# Patient Record
Sex: FEMALE | Race: WHITE | HISPANIC OR LATINO | Employment: FULL TIME | URBAN - NONMETROPOLITAN AREA
[De-identification: names, ages, dates, MRNs, and addresses within clinical notes are randomized per-mention and may not be internally consistent; named-entity substitution may affect disease eponyms.]

---

## 2023-09-07 NOTE — PROGRESS NOTES
Subjective      HPI:    Delaney Kaplan. Is 62 y.o.. female. Here for acute visit-             Chief Complaint   Patient presents with    Ear Fullness     Constant X approx 2 weeks      Sore Throat     Constant X approx 2 days    Sinus Problem     Drainage constant x 2 days    PT denies fever  Pt has not taken anything to alleviate sxs, nothing exacerbates sxs               has pt been seen recently for this problem ( within past 2-3 weeks) ?  No    if yes- where?n/a    by who?n/a    what treatment was provided?n/a      Social History     Tobacco Use   Smoking Status Former    Packs/day: 1.50    Years: 6.00    Additional pack years: 0.00    Total pack years: 9.00    Types: Cigarettes   Smokeless Tobacco Never        reports current alcohol use.       Objective            Vitals:    09/08/23 1331   BP: 106/71   BP Location: Left arm   Patient Position: Sitting   BP Cuff Size: Large adult   Pulse: 80   Resp: 14   Temp: 36.4 °C (97.5 °F)   SpO2: 96%   Weight: 97.1 kg (214 lb)           PHYSICAL:      Pt is A and O x3, NAD, Vital signs are within normal limits  General Appearance- normal , good hygiene, talks easily  EYES- conjunctiva- normal  lids- normal  EARS/NOSE-TM's normal, head normocephalic and atraumatic, nasopharynx-no nasal discharge, no trismus, no hot potato voice  OROPHARYNX- red with mild exudate  NECK- supple, FROM  LYMPH- mild bilateral scattered cervical lymph nodes palpated - tender  CV- RRR without murmur  EXTREMITIES- no edema or varicosities  PULM- CTA bilaterally  Respiratory effort- normal respiratory effort , no retractions or nasal flaring   ABDOMEN- normoactive BS's  SKIN- no abnormal skin lesions on inspection or palpation   NEURO- no focal deficits  PSYCH- pleasant, normal judgement and insight          BP Readings from Last 3 Encounters:   09/08/23 106/71   12/15/22 128/80   10/04/22 106/71         Wt Readings from Last 3 Encounters:   09/08/23 97.1 kg (214 lb)   12/15/22 102 kg (225 lb 6.4  oz)   10/04/22 102 kg (225 lb 9.6 oz)       BMI Readings from Last 3 Encounters:   09/08/23 35.61 kg/m²   12/15/22 37.51 kg/m²   10/04/22 36.41 kg/m²           The number and complexity of problems addressed is considered moderate.  The amount and/or complexity of data reviewed and analyzed is considered moderate. The risk of complications and/or morbidity/mortality of patient is considered moderate. Overall, this patient encounter is considered a moderate risk visit.      What are antibiotics?  Antibiotics are medicines that help people fight infections caused by bacteria. They work by killing bacteria that are in the body. These medicines come in many different forms, including pills, ointments, and liquids that are given by injection.    Antibiotics can do a lot of good. For people with serious bacterial infections, antibiotics can save lives. But people use them far too often, even when they're not needed. This is causing a very serious problem called antibiotic resistance. Antibiotic resistance happens when bacteria that have been exposed to an antibiotic change so that the antibiotic can no longer kill them. In those bacteria, the antibiotic has no effect. Because of this problem, doctors are having a harder and harder time treating infections. Experts worry that there will soon be infections that don't respond to any antibiotics.    When are antibiotics helpful?  Antibiotics can help people fight off infections caused by bacteria. They do not work on infections caused by viruses.    Some common bacterial infections that are treated with antibiotics include:    Strep throat    Pneumonia (an infection of the lungs)    Bladder infections    Infections you catch through sex, such as gonorrhea and chlamydia    When are antibiotics NOT helpful?    Antibiotics do not work on infections caused by viruses.    Antibiotics are not helpful for the common cold, because the common cold is caused by a virus.    Antibiotics  are not helpful for the flu, because the flu is caused by a virus. (People with the flu can be treated with medicines called antiviral medicines, which are different from antibiotics.)      Even though antibiotics don't work on infections caused by viruses, people sometimes believe that they do. That's because they took antibiotics for a viral infection before and then got better. The problem is that those people would have gotten better with or without an antibiotic. When they get better with the antibiotic, they think that's what cured them, when in reality the antibiotic had nothing to do with it.    What's the harm in taking antibiotics even if they might not help?  There are many reasons you should not take antibiotics unless you absolutely need them:    Antibiotics cause side effects, such as nausea, vomiting, and diarrhea. They can even make it more likely that you will get a different kind of infection, such as yeast infection (if you are a woman).    Allergies to antibiotics are common. You can develop an allergy to an antibiotic, even if you have not had a problem with it before. Some allergies are just unpleasant, causing rashes and itching. But some can be very serious and even life-threatening. It is better to avoid any risk of an allergy, if the antibiotic wouldn't help you anyway.    Overuse of antibiotics leads to antibiotic resistance. Using antibiotics when they are not needed gives bacteria a chance to change, so that the antibiotics cannot hurt them later on. People who have infections caused by antibiotic-resistant bacteria often have to be treated in the hospital with many different antibiotics. People can even die from these infections, because there is no antibiotic that will cure them.    When should I take antibiotics?  You should take antibiotics only when a doctor or nurse prescribes them to you. You should never take antibiotics prescribed to someone else, and you should not take  "antibiotics that were prescribed to you for a previous illness. When prescribing an antibiotic, doctors and nurses have to carefully pick the right antibiotic for a particular infection. Not all antibiotics work on all bacteria.    If you do have an infection caused by a bacteria, your doctor or nurse might want to find out what that bacteria is, and which antibiotics can kill it. They do this by taking a \"culture\" of the bacteria and growing it in the lab. But it's not possible to do a culture on someone who has already started an antibiotic. So if you start an antibiotic without input from a doctor or nurse it will be impossible to know if you have taken the right one.    What can I do to reduce antibiotic resistance?  Here are some things that can help:    Do not pressure your doctor or nurse for antibiotics when they do not think you need them.    If you are prescribed antibiotics, finish all of the medicine and take it exactly as directed. Never skip doses or stop taking the medicine without talking to your doctor or nurse.    Do not give antibiotics that were prescribed to you to anyone else.    What if my doctor prescribes an antibiotic that did not work for me before?  If an antibiotic did not work for you before, that does not mean it will never work for you. If you have used an antibiotic before and it did not work, tell your doctor. But keep in mind that the infection you had before might not have been caused by the same bacteria that you have now. The \"best\" antibiotic is the right one for the bacteria causing the infection, not for the person with the infection.    What if I am allergic to an antibiotic?  If you had a bad reaction to an antibiotic, tell your doctor or nurse about it. But do not assume you are allergic unless your doctor or nurse tells you that you are.    Many people who think they are allergic to an antibiotic are wrong. If you get nauseous after taking an antibiotic, that does not " mean you are allergic to it. Nausea is a common side effect of many antibiotics. If you are a woman and you get a yeast infection after taking an antibiotic, that does not mean you are allergic to it. Yeast infections are a common side effect of antibiotics.    Symptoms of antibiotic allergy can be mild and include a flat rash and itching. They can also be more serious and include:    Hives - Hives are raised, red patches of skin that are usually very itchy (picture 1).    Lip or tongue swelling    Trouble swallowing or breathing    Serious allergy symptoms can start right after you start taking an antibiotic if you are very sensitive. Less serious symptoms, on the other hand, often start a day or more later.    Almost all antibiotics may cause possible side effects of allergic reaction, nausea, vomiting, diarrhea- most worrisome caused by C. diff, and secondary yeast infection.        Assessment/Plan        1. Pharyngitis  amoxicillin (Amoxil) 500 mg capsule      2. Otalgia, unspecified laterality  amoxicillin (Amoxil) 500 mg capsule                  Start amoxicillin         Call if no better or if symptoms worsen

## 2023-09-08 ENCOUNTER — OFFICE VISIT (OUTPATIENT)
Dept: PRIMARY CARE | Facility: CLINIC | Age: 63
End: 2023-09-08
Payer: COMMERCIAL

## 2023-09-08 VITALS
WEIGHT: 214 LBS | DIASTOLIC BLOOD PRESSURE: 71 MMHG | RESPIRATION RATE: 14 BRPM | HEART RATE: 80 BPM | TEMPERATURE: 97.5 F | SYSTOLIC BLOOD PRESSURE: 106 MMHG | BODY MASS INDEX: 35.61 KG/M2 | OXYGEN SATURATION: 96 %

## 2023-09-08 DIAGNOSIS — H92.09 OTALGIA, UNSPECIFIED LATERALITY: ICD-10-CM

## 2023-09-08 DIAGNOSIS — J02.9 PHARYNGITIS, UNSPECIFIED ETIOLOGY: Primary | ICD-10-CM

## 2023-09-08 PROBLEM — J30.9 ALLERGIC RHINITIS: Status: ACTIVE | Noted: 2023-09-08

## 2023-09-08 PROBLEM — J06.9 UPPER RESPIRATORY INFECTION, ACUTE: Status: ACTIVE | Noted: 2023-09-08

## 2023-09-08 PROBLEM — H61.23 BILATERAL IMPACTED CERUMEN: Status: ACTIVE | Noted: 2023-09-08

## 2023-09-08 PROBLEM — R80.9 TYPE 2 DIABETES MELLITUS WITH MICROALBUMINURIA (MULTI): Status: ACTIVE | Noted: 2023-09-08

## 2023-09-08 PROBLEM — J32.9 SINUSITIS: Status: ACTIVE | Noted: 2023-09-08

## 2023-09-08 PROBLEM — H69.90 ETD (EUSTACHIAN TUBE DYSFUNCTION): Status: ACTIVE | Noted: 2023-09-08

## 2023-09-08 PROBLEM — M25.569 PAIN IN JOINT, LOWER LEG: Status: ACTIVE | Noted: 2023-09-08

## 2023-09-08 PROBLEM — R09.81 NASAL CONGESTION: Status: ACTIVE | Noted: 2023-09-08

## 2023-09-08 PROBLEM — M25.549 PAIN IN THUMB JOINT WITH MOVEMENT: Status: ACTIVE | Noted: 2023-09-08

## 2023-09-08 PROBLEM — H93.8X9 EAR CONGESTION: Status: ACTIVE | Noted: 2023-09-08

## 2023-09-08 PROBLEM — E11.29 TYPE 2 DIABETES MELLITUS WITH MICROALBUMINURIA (MULTI): Status: ACTIVE | Noted: 2023-09-08

## 2023-09-08 PROBLEM — E11.9 DIABETES MELLITUS, NEW ONSET (MULTI): Status: ACTIVE | Noted: 2023-09-08

## 2023-09-08 PROBLEM — R80.9 MICROALBUMINURIA: Status: ACTIVE | Noted: 2023-09-08

## 2023-09-08 PROBLEM — M79.642 PAIN OF LEFT HAND: Status: ACTIVE | Noted: 2023-09-08

## 2023-09-08 PROCEDURE — 99214 OFFICE O/P EST MOD 30 MIN: CPT | Performed by: FAMILY MEDICINE

## 2023-09-08 PROCEDURE — 4010F ACE/ARB THERAPY RXD/TAKEN: CPT | Performed by: FAMILY MEDICINE

## 2023-09-08 PROCEDURE — 1036F TOBACCO NON-USER: CPT | Performed by: FAMILY MEDICINE

## 2023-09-08 PROCEDURE — 3078F DIAST BP <80 MM HG: CPT | Performed by: FAMILY MEDICINE

## 2023-09-08 PROCEDURE — 3074F SYST BP LT 130 MM HG: CPT | Performed by: FAMILY MEDICINE

## 2023-09-08 RX ORDER — BLOOD SUGAR DIAGNOSTIC
STRIP MISCELLANEOUS
COMMUNITY
End: 2023-09-08 | Stop reason: WASHOUT

## 2023-09-08 RX ORDER — CALCIUM CARBONATE/VITAMIN D3 500 MG-2.5
1 TABLET,CHEWABLE ORAL DAILY
COMMUNITY
Start: 2022-07-07 | End: 2024-01-17 | Stop reason: WASHOUT

## 2023-09-08 RX ORDER — MELOXICAM 15 MG/1
15 TABLET ORAL DAILY
COMMUNITY
Start: 2023-08-29 | End: 2023-10-24

## 2023-09-08 RX ORDER — AMOXICILLIN 500 MG/1
1000 CAPSULE ORAL 2 TIMES DAILY
Qty: 40 CAPSULE | Refills: 0 | Status: SHIPPED | OUTPATIENT
Start: 2023-09-08 | End: 2023-09-18

## 2023-09-08 RX ORDER — ACETAMINOPHEN 160 MG
1 TABLET,DISINTEGRATING ORAL DAILY
COMMUNITY

## 2023-10-24 PROBLEM — R80.9 MICROALBUMINURIA: Status: RESOLVED | Noted: 2023-09-08 | Resolved: 2023-10-24

## 2023-10-24 PROBLEM — J06.9 UPPER RESPIRATORY INFECTION, ACUTE: Status: RESOLVED | Noted: 2023-09-08 | Resolved: 2023-10-24

## 2023-10-24 PROBLEM — H61.23 BILATERAL IMPACTED CERUMEN: Status: RESOLVED | Noted: 2023-09-08 | Resolved: 2023-10-24

## 2023-10-24 PROBLEM — Z86.69 HX OF MIGRAINES: Status: ACTIVE | Noted: 2023-10-24

## 2023-10-24 PROBLEM — E66.812 OBESITY, CLASS II, BMI 35-39.9: Status: ACTIVE | Noted: 2020-05-14

## 2023-10-24 PROBLEM — E11.9 DIABETES (MULTI): Status: ACTIVE | Noted: 2023-09-08

## 2023-10-24 PROBLEM — H93.8X9 EAR CONGESTION: Status: RESOLVED | Noted: 2023-09-08 | Resolved: 2023-10-24

## 2023-10-24 PROBLEM — J32.9 SINUSITIS: Status: RESOLVED | Noted: 2023-09-08 | Resolved: 2023-10-24

## 2023-10-24 PROBLEM — J30.9 ALLERGIC RHINITIS: Status: RESOLVED | Noted: 2023-09-08 | Resolved: 2023-10-24

## 2023-10-24 PROBLEM — R09.81 NASAL CONGESTION: Status: RESOLVED | Noted: 2023-09-08 | Resolved: 2023-10-24

## 2023-10-24 PROBLEM — R10.12 ABDOMINAL WALL PAIN IN LEFT UPPER QUADRANT: Status: ACTIVE | Noted: 2017-05-09

## 2023-10-24 PROBLEM — E66.9 OBESITY, CLASS II, BMI 35-39.9: Status: ACTIVE | Noted: 2020-05-14

## 2023-10-24 PROBLEM — D25.9 FIBROID UTERUS: Status: ACTIVE | Noted: 2023-10-24

## 2023-10-24 PROBLEM — H92.09 EAR DISCOMFORT: Status: RESOLVED | Noted: 2023-09-08 | Resolved: 2023-10-24

## 2023-10-24 PROBLEM — M79.642 PAIN OF LEFT HAND: Status: RESOLVED | Noted: 2023-09-08 | Resolved: 2023-10-24

## 2023-10-26 ENCOUNTER — OFFICE VISIT (OUTPATIENT)
Dept: ORTHOPEDIC SURGERY | Facility: CLINIC | Age: 63
End: 2023-10-26
Payer: COMMERCIAL

## 2023-10-26 VITALS — HEIGHT: 65 IN | WEIGHT: 230 LBS | BODY MASS INDEX: 38.32 KG/M2

## 2023-10-26 DIAGNOSIS — M17.11 PRIMARY OSTEOARTHRITIS OF RIGHT KNEE: Primary | ICD-10-CM

## 2023-10-26 PROCEDURE — 4010F ACE/ARB THERAPY RXD/TAKEN: CPT | Performed by: ORTHOPAEDIC SURGERY

## 2023-10-26 PROCEDURE — 99214 OFFICE O/P EST MOD 30 MIN: CPT | Performed by: ORTHOPAEDIC SURGERY

## 2023-10-26 PROCEDURE — 1036F TOBACCO NON-USER: CPT | Performed by: ORTHOPAEDIC SURGERY

## 2023-10-26 RX ORDER — MELOXICAM 7.5 MG/1
7.5 TABLET ORAL DAILY
Qty: 90 EACH | Refills: 3 | Status: SHIPPED | OUTPATIENT
Start: 2023-10-26 | End: 2024-04-17 | Stop reason: WASHOUT

## 2023-10-27 NOTE — PROGRESS NOTES
Patient is a 62-year-old female who presents today for evaluation of severe right knee pain.  Is been on for some time now.  She saw Dr. Key.  She is a cortisone injections and taking anti-inflammatories.  She is unable to perform a home exercise program start of pain and disability.  She has never had surgery in the knee previously.  She had x-rays which demonstrated moderate disease but an MRI which revealed significant medial compartment patellofemoral disease with a degenerative tear of her medial meniscus.  She extremely fed up with her quality of life.    Right knee:  AAOx3, NAD, walks with a moderate antalgic gait  Varus allignment  Range of motion lacks 10 degrees of full extension and flexes to 110 degrees  Stable to varus/valgus/anterior/posterior stress through out the range of motion  Slight laxity with varus stress  Diffuse medial  joint line tenderness to palpation  Moderate effusion  SILT in a анна/saph/per/tib distribution  5/5 knee extension/df/pf/ehl  ½ dorsalis pedis and posterior tibial pulse  no popliteal lymphadenopathy  no other overlying lesions  mood: euthymic  Respirations non labored    Plain films were reviewed by myself in clinic today.  She has moderate medial and patellofemoral disease on her plain films.  MRI reveals significant medial compartment disease.  Degenerative meniscus tear, significant lateral facet disease and bipolar edema.    Patient is now failed a greater than 3-month trial of reasonable conservative treatment and wishes proceed with surgery which is indicated at this time.  Discussed the risk benefits alternatives to surgery.  All of their questions were answered.    They are a candidate for outpatient total knee arthroplasty.    I talked with the patient at length about risks, limitations, benefits and alternatives to total knee replacement today. I reviewed concerns about implant wear, loosening, breakage, infection and infection prophylaxis, DVT, PE, death and  other medical and anesthetic complications of surgery. We talked about the potential for persistent pain following surgery since there are many possible causes for knee and leg pain. The patient was advised that knee replacement will only relieve pain that is coming from the knee. We talked about limited range of motion following knee replacement and the importance of physical therapy and their motivation. We talked about improvements in pain management post-operatively and our accelerated rehab program. We talked about wound healing issues and neurovascular complications of surgery. I reviewed functional and activity restrictions in detail. We discussed the possible need for a homologous blood transfusion. We discussed the fact that many of our patients are able to go home in 1 day or less depending on their health, mobility, pre-op preparation, individual home situation and personal preference.  The patient has identified their personal goals of their joint replacement surgery and recovery and we have discussed them. These are documented in our [employer] patient engagement platform. In addition, we have discussed the advantages and disadvantages of various implant and fixation options, as well as various surgical approaches.  The basic concepts of the joint replacement procedure has been reviewed with the patient and the patient has been provided the opportunity to see an actual implant either in the office or in our pre-op education class.  All of the patients questions were answered. The patient can call my office to schedule surgery and the pre-op teaching class. I told the patient that they should contact their primary care physician to discuss fitness for surgery.    This note was created using voice recognition software and was not corrected for typographical or grammatical errors.

## 2023-11-09 ENCOUNTER — APPOINTMENT (OUTPATIENT)
Dept: ORTHOPEDIC SURGERY | Facility: CLINIC | Age: 63
End: 2023-11-09
Payer: COMMERCIAL

## 2023-11-26 ENCOUNTER — TRANSCRIBE ORDERS (OUTPATIENT)
Dept: OPERATING ROOM | Facility: HOSPITAL | Age: 63
End: 2023-11-26
Payer: COMMERCIAL

## 2023-11-26 ENCOUNTER — HOSPITAL ENCOUNTER (OUTPATIENT)
Facility: HOSPITAL | Age: 63
Setting detail: OUTPATIENT SURGERY
End: 2023-11-26
Attending: ORTHOPAEDIC SURGERY | Admitting: ORTHOPAEDIC SURGERY
Payer: COMMERCIAL

## 2023-11-26 DIAGNOSIS — M17.11 UNILATERAL PRIMARY OSTEOARTHRITIS, RIGHT KNEE: Primary | ICD-10-CM

## 2023-12-01 ENCOUNTER — TELEPHONE (OUTPATIENT)
Dept: ORTHOPEDIC SURGERY | Facility: HOSPITAL | Age: 63
End: 2023-12-01
Payer: COMMERCIAL

## 2023-12-07 ENCOUNTER — OFFICE VISIT (OUTPATIENT)
Dept: PRIMARY CARE | Facility: CLINIC | Age: 63
End: 2023-12-07
Payer: COMMERCIAL

## 2023-12-07 VITALS
BODY MASS INDEX: 35.48 KG/M2 | SYSTOLIC BLOOD PRESSURE: 120 MMHG | HEART RATE: 80 BPM | TEMPERATURE: 97.8 F | OXYGEN SATURATION: 97 % | WEIGHT: 213.2 LBS | DIASTOLIC BLOOD PRESSURE: 78 MMHG

## 2023-12-07 DIAGNOSIS — R05.9 COUGH, UNSPECIFIED TYPE: ICD-10-CM

## 2023-12-07 DIAGNOSIS — J01.80 OTHER ACUTE SINUSITIS, RECURRENCE NOT SPECIFIED: Primary | ICD-10-CM

## 2023-12-07 PROCEDURE — 4010F ACE/ARB THERAPY RXD/TAKEN: CPT | Performed by: FAMILY MEDICINE

## 2023-12-07 PROCEDURE — 3074F SYST BP LT 130 MM HG: CPT | Performed by: FAMILY MEDICINE

## 2023-12-07 PROCEDURE — 3078F DIAST BP <80 MM HG: CPT | Performed by: FAMILY MEDICINE

## 2023-12-07 PROCEDURE — 99214 OFFICE O/P EST MOD 30 MIN: CPT | Performed by: FAMILY MEDICINE

## 2023-12-07 PROCEDURE — 1036F TOBACCO NON-USER: CPT | Performed by: FAMILY MEDICINE

## 2023-12-07 RX ORDER — AMOXICILLIN 500 MG/1
CAPSULE ORAL
Qty: 40 CAPSULE | Refills: 0 | Status: SHIPPED | OUTPATIENT
Start: 2023-12-07 | End: 2024-01-11 | Stop reason: ALTCHOICE

## 2023-12-07 NOTE — PROGRESS NOTES
Subjective      HPI:    Delaney Kaplan. Is 63 y.o.. female. Here for acute visit-             Chief Complaint   Patient presents with    Cough    Sinusitis    Immunizations     Declined flu vaccine earlier in the season         What concern/ problem/pain/symptom  brings you here today?  Cough and sinus drainage, headache      how long has pt had sxs?  Approx 1 week      describe symptoms-    fever- yes    Are symptoms all day ? Yes  Do symptoms occur at night?  Yes      has pt tried anything for current symptoms, including medications (OTC or prescription)  ?  No      what makes symptoms worse?  Nothing      Does anything make symptoms better?  Nothing            Social History     Tobacco Use   Smoking Status Former    Packs/day: 1.50    Years: 6.00    Additional pack years: 0.00    Total pack years: 9.00    Types: Cigarettes    Quit date:     Years since quittin.9   Smokeless Tobacco Never        reports current alcohol use.       Objective            Vitals:    23 1035   BP: 120/78   BP Location: Left arm   Patient Position: Sitting   BP Cuff Size: Adult   Pulse: 80   Temp: 36.6 °C (97.8 °F)   TempSrc: Temporal   SpO2: 97%   Weight: 96.7 kg (213 lb 3.2 oz)           PHYSICAL:      Pt is A and O x3, NAD, Vital signs are within normal limits  General Appearance- normal , good hygiene,talks easily  EYES- conjunctiva and lids- normal  EARS/NOSE-TM's normal, head normocephalic and atraumatic, nasopharynx-green nasal discharge, no trismus, no hot potato voice  OROPHARYNX- no exudate  NECK- supple, FROM  LYMPH- no cervical lymph nodes palpated   CV- RRR without murmur  PULM- CTA bilaterally  Respiratory effort- normal respiratory effort , no retractions or nasal flaring   ABDOMEN- normoactive BS's   EXTREMITIES-no edema,NT  SKIN- no abnormal skin lesions on inspection or palpation   NEURO- no focal deficits  PSYCH- pleasant, normal judgement and insight      BP Readings from Last 3 Encounters:   23  120/78   09/08/23 106/71   12/15/22 128/80         Wt Readings from Last 3 Encounters:   12/07/23 96.7 kg (213 lb 3.2 oz)   10/26/23 104 kg (230 lb)   09/08/23 97.1 kg (214 lb)       BMI Readings from Last 3 Encounters:   12/07/23 35.48 kg/m²   10/26/23 38.27 kg/m²   09/08/23 35.61 kg/m²           The number and complexity of problems addressed is considered moderate.  The amount and/or complexity of data reviewed and analyzed is considered moderate. The risk of complications and/or morbidity/mortality of patient is considered moderate. Overall, this patient encounter is considered a moderate risk visit.      What are antibiotics?  Antibiotics are medicines that help people fight infections caused by bacteria. They work by killing bacteria that are in the body. These medicines come in many different forms, including pills, ointments, and liquids that are given by injection.    Antibiotics can do a lot of good. For people with serious bacterial infections, antibiotics can save lives. But people use them far too often, even when they're not needed. This is causing a very serious problem called antibiotic resistance. Antibiotic resistance happens when bacteria that have been exposed to an antibiotic change so that the antibiotic can no longer kill them. In those bacteria, the antibiotic has no effect. Because of this problem, doctors are having a harder and harder time treating infections. Experts worry that there will soon be infections that don't respond to any antibiotics.    When are antibiotics helpful?  Antibiotics can help people fight off infections caused by bacteria. They do not work on infections caused by viruses.    Some common bacterial infections that are treated with antibiotics include:    Strep throat    Pneumonia (an infection of the lungs)    Bladder infections    Infections you catch through sex, such as gonorrhea and chlamydia    When are antibiotics NOT helpful?    Antibiotics do not work on  infections caused by viruses.    Antibiotics are not helpful for the common cold, because the common cold is caused by a virus.    Antibiotics are not helpful for the flu, because the flu is caused by a virus. (People with the flu can be treated with medicines called antiviral medicines, which are different from antibiotics.)      Even though antibiotics don't work on infections caused by viruses, people sometimes believe that they do. That's because they took antibiotics for a viral infection before and then got better. The problem is that those people would have gotten better with or without an antibiotic. When they get better with the antibiotic, they think that's what cured them, when in reality the antibiotic had nothing to do with it.    What's the harm in taking antibiotics even if they might not help?  There are many reasons you should not take antibiotics unless you absolutely need them:    Antibiotics cause side effects, such as nausea, vomiting, and diarrhea. They can even make it more likely that you will get a different kind of infection, such as yeast infection (if you are a woman).    Allergies to antibiotics are common. You can develop an allergy to an antibiotic, even if you have not had a problem with it before. Some allergies are just unpleasant, causing rashes and itching. But some can be very serious and even life-threatening. It is better to avoid any risk of an allergy, if the antibiotic wouldn't help you anyway.    Overuse of antibiotics leads to antibiotic resistance. Using antibiotics when they are not needed gives bacteria a chance to change, so that the antibiotics cannot hurt them later on. People who have infections caused by antibiotic-resistant bacteria often have to be treated in the hospital with many different antibiotics. People can even die from these infections, because there is no antibiotic that will cure them.    When should I take antibiotics?  You should take antibiotics  "only when a doctor or nurse prescribes them to you. You should never take antibiotics prescribed to someone else, and you should not take antibiotics that were prescribed to you for a previous illness. When prescribing an antibiotic, doctors and nurses have to carefully pick the right antibiotic for a particular infection. Not all antibiotics work on all bacteria.    If you do have an infection caused by a bacteria, your doctor or nurse might want to find out what that bacteria is, and which antibiotics can kill it. They do this by taking a \"culture\" of the bacteria and growing it in the lab. But it's not possible to do a culture on someone who has already started an antibiotic. So if you start an antibiotic without input from a doctor or nurse it will be impossible to know if you have taken the right one.    What can I do to reduce antibiotic resistance?  Here are some things that can help:    Do not pressure your doctor or nurse for antibiotics when they do not think you need them.    If you are prescribed antibiotics, finish all of the medicine and take it exactly as directed. Never skip doses or stop taking the medicine without talking to your doctor or nurse.    Do not give antibiotics that were prescribed to you to anyone else.    What if my doctor prescribes an antibiotic that did not work for me before?  If an antibiotic did not work for you before, that does not mean it will never work for you. If you have used an antibiotic before and it did not work, tell your doctor. But keep in mind that the infection you had before might not have been caused by the same bacteria that you have now. The \"best\" antibiotic is the right one for the bacteria causing the infection, not for the person with the infection.    What if I am allergic to an antibiotic?  If you had a bad reaction to an antibiotic, tell your doctor or nurse about it. But do not assume you are allergic unless your doctor or nurse tells you that you " are.    Many people who think they are allergic to an antibiotic are wrong. If you get nauseous after taking an antibiotic, that does not mean you are allergic to it. Nausea is a common side effect of many antibiotics. If you are a woman and you get a yeast infection after taking an antibiotic, that does not mean you are allergic to it. Yeast infections are a common side effect of antibiotics.    Symptoms of antibiotic allergy can be mild and include a flat rash and itching. They can also be more serious and include:    Hives - Hives are raised, red patches of skin that are usually very itchy (picture 1).    Lip or tongue swelling    Trouble swallowing or breathing    Serious allergy symptoms can start right after you start taking an antibiotic if you are very sensitive. Less serious symptoms, on the other hand, often start a day or more later.    Almost all antibiotics may cause possible side effects of allergic reaction, nausea, vomiting, diarrhea- most worrisome caused by C. diff, and secondary yeast infection.        Assessment/Plan        1. Other acute sinusitis, recurrence not specified  amoxicillin (Amoxil) 500 mg capsule      2. Cough, unspecified type                  Start amoxicillin            Call if no better or if symptoms worsen        Plans knee replacement surgery 2/5/23 - Dr Bray

## 2023-12-14 ENCOUNTER — ANCILLARY PROCEDURE (OUTPATIENT)
Dept: RADIOLOGY | Facility: CLINIC | Age: 63
End: 2023-12-14
Payer: COMMERCIAL

## 2023-12-14 DIAGNOSIS — M17.11 UNILATERAL PRIMARY OSTEOARTHRITIS, RIGHT KNEE: ICD-10-CM

## 2023-12-19 NOTE — PROGRESS NOTES
Thank you for attending our Joint Replacement class today in preparation for your upcoming surgery.  Topics discussed include:    MyChart Enrollment  Communication with Care Team  My Chart is the best form of communication to reach all of your caregivers  You can send messages to specific care givers, or a care team  Continued Education  You will be enrolled in a Total Joint Replacement care plan to receive additional education before and after surgery  You can review a short recording of the class content  Access to Medical Records  You can access test results, office notes, appointments, etc.  You can connect to other healthcare systems who use [employer] ([facility], [facility], [facility], etc.)  [employer]  Program Information  Consent to Enroll    Background/Understanding of Joint Replacement Surgery  Potential for same day discharge  Any questions or concerns to be directed to the surgeon's office    How to Prepare for Surgery  Use of Nicotine Products/Smoking  Stop several weeks before surgery  Such products slow down the healing process and increase risk of post-op infection and complications  Clearance for Surgery  Medical Clearance by Specialists  Dental Clearance  Cracked/Broken/Loose teeth left untreated may postpone surgery  The importance of post-op antibiotics for dental visits per surgeon protocol  Preadmission Testing  **Potential for postponed surgery if appropriate clearance is not obtained  Medication Instruction  Follow instructions provided by the doctor who prescribes your medication (typically, but not limited to cardiologist)  Preadmission testing will provide additional instructions during your appointment on what to stop and what to take as you get closer to surgery  For clarification of these instructions, please call preadmission testing directly - [phone]  Tips for Preparing the home for discharge from the hospital  Care Partner  Requirement for surgery, the patient must have a plan to have  help at home  Potential for postponed surgery if plan for home support cannot be established  How the care partner can help after surgery  CHG Body Wash/Mouth Wash  Follow the instructions given at preadmission testing  Body wash is to be used on the body and hair for 5 washes  Mouthwash is to be used the night before and morning of surgery  **This is a system-wide protocol developed by infectious disease professionals, we will not alter our recommendations for those with sensitive skin or those who have special hair needs.  Please follow the instructions as they are written as this will provide the best infection prevention measures for surgery.  Should you have an allergy to one of the products, please discuss with your preadmission team**    What to Expect in the Hospital/At Home  Morning of Surgery NPO Guidelines  Nothing to eat after midnight  Water can be consumed up to 2 hours prior to arrival  Surgical and Post-Surgical Care Team  Surgical Team  Anesthesia Team  Nursing  Physical Therapy  Care Coordinating  Pharmacy  Hospital Arrival Instructions  Arrive at the time provided to you  Consider traffic patterns (rush-hour) based on arrival time  Have arrangements made for a ride home  If discharging same day, care partner should remain at the hospital  Recovering after Surgery  Recovery Room - Visitors are not brought back  Transition to hospital room - 2nd Floor, Visitors will be directed to your room  The presence of and strategies for controlling surgical pain and swelling  The importance of early mobility  Side effects after surgery  What to expect if staying overnight    Discharge Planning  The intended plan for discharge will be for patients to discharge home  All patients require a care partner (family, friend, neighbor, etc.) to stay with the patient for the first few nights after surgery  The inability to secure help at home will postpone surgery  Home Care Services set up per surgeon order  Physical  Therapy  Occupational Therapy  **If desired, private duty care can be arranged by the patient ahead of time**  Outpatient Physical Therapy per surgeon order    Recovering at Home  Wound Care  Follow wound care instructions found in your discharge paperwork  Bandage is water-resistant and you may shower with the bandage  Do not scrub directly over the bandage  Do not submerge in water until cleared (bathtub, hot tub, pool, etc.)    Post-Op Risk Prevention  Infection Prevention  Promptly seek treatment for any infections post-operatively  Routine dental visits must be postponed for 3 months after surgery  Your surgeon may require antibiotics prior to future dental visits  Any concerns for infection not related directly to the knee or the hip should be managed by your primary care provider  Blood Clots  Be sure to complete the course of blood thinning medication as prescribed by your surgeon  Movement every 1-2 hours during the day is encouraged to prevent blood clots  Monitor for signs of blood clots  Wear compression stockings as prescribed by your surgeon  Constipation  Constipation is common following surgery  Drink plenty of fluids  Take stool softener/laxative as prescribed by your surgeon  Move around frequently  Eat foods high in fiber  Fall Prevention  Prepare home ahead of time to clear space to move with walker  Remove throw rugs and electrical cords from walkways  Install railings near any stairways with more than 2 steps  Use night lights for increased visibility at night  Continue to use your assistive device until cleared by surgeon or physical therapy  Dislocation Prevention - Not all procedures will have dislocation precautions  Follow dislocation precautions provided by your surgeon  It is OK to resume sexual activity about 6 weeks following surgery  Be sure to follow any dislocation precautions assigned    Durable Medical Equipment  Cold Therapy  Breg Cold Therapy Machines  Ice/Gel Packs  Assistive  Devices  Folding Walker with Wheels (in the front only)  No Rollators  Crutches if approved by Physical Therapy and Surgeon after surgery  Hip Kits  Raised Toilet Seats  Additional Compression Stockings    Joint Preservation  Healthy Activities when Cleared  Walking  Swimming  Bike Riding  Activities to Avoid  Refrain from repetitive motions which have a high impact on the joint  Gradual Progression  Progress activity slowly, listen to your body  Common Findings - NORMAL after surgery  Clicking/Grinding  Numbness near incision    Physical Therapy  Prehabilitation exercises  START TODAY ON BOTH LEGS  Surgery Specific Precautions  Follow surgery specific precautions found in your discharge paperwork    Follow-Up Visit  All patients will see their surgeon for a follow up visit after surgery  The visit may range from 2-6 weeks after surgery and is surgeon specific      Please don't hesitate to reach out if you have any additional questions or concerns.    Rose Rubin MBA, BSN, RN-BC  Orthopedic   [facility] [phone]

## 2023-12-20 ENCOUNTER — HOSPITAL ENCOUNTER (OUTPATIENT)
Dept: RADIOLOGY | Facility: HOSPITAL | Age: 63
Discharge: HOME | End: 2023-12-20
Payer: COMMERCIAL

## 2023-12-20 ENCOUNTER — EDUCATION (OUTPATIENT)
Dept: ORTHOPEDIC SURGERY | Facility: HOSPITAL | Age: 63
End: 2023-12-20
Payer: COMMERCIAL

## 2023-12-20 PROCEDURE — 73562 X-RAY EXAM OF KNEE 3: CPT | Mod: RT

## 2023-12-20 PROCEDURE — 77073 BONE LENGTH STUDIES: CPT

## 2023-12-20 ASSESSMENT — KOOS JR
STRAIGHTENING KNEE FULLY: MODERATE
BENDING TO THE FLOOR TO PICK UP OBJECT: MILD
HOW SEVERE IS YOUR KNEE STIFFNESS AFTER FIRST WAKING IN MORNING: MODERATE
STANDING UPRIGHT: SEVERE
TWISING OR PIVOTING ON KNEE: SEVERE
GOING UP OR DOWN STAIRS: MILD
KOOS JR SCORING: 54.84
RISING FROM SITTING: MILD

## 2023-12-26 ENCOUNTER — PROCEDURE VISIT (OUTPATIENT)
Dept: ORTHOPEDIC SURGERY | Facility: HOSPITAL | Age: 63
End: 2023-12-26
Payer: COMMERCIAL

## 2023-12-26 PROCEDURE — E0143 WALKER FOLDING WHEELED W/O S: HCPCS | Performed by: ORTHOPAEDIC SURGERY

## 2024-01-05 ENCOUNTER — APPOINTMENT (OUTPATIENT)
Dept: ORTHOPEDIC SURGERY | Facility: CLINIC | Age: 64
End: 2024-01-05
Payer: COMMERCIAL

## 2024-01-05 ENCOUNTER — OFFICE VISIT (OUTPATIENT)
Dept: ORTHOPEDIC SURGERY | Facility: CLINIC | Age: 64
End: 2024-01-05
Payer: COMMERCIAL

## 2024-01-05 DIAGNOSIS — M17.11 PRIMARY OSTEOARTHRITIS OF RIGHT KNEE: Primary | ICD-10-CM

## 2024-01-05 PROCEDURE — 4010F ACE/ARB THERAPY RXD/TAKEN: CPT | Performed by: ORTHOPAEDIC SURGERY

## 2024-01-05 PROCEDURE — 1036F TOBACCO NON-USER: CPT | Performed by: ORTHOPAEDIC SURGERY

## 2024-01-05 PROCEDURE — 99214 OFFICE O/P EST MOD 30 MIN: CPT | Performed by: ORTHOPAEDIC SURGERY

## 2024-01-05 NOTE — PROGRESS NOTES
Patient is a 63-year-old female who presents today for discussion of upcoming right total knee arthroplasty.  She has now failed a greater than 3-month trial of reasonable conservative treatment including cortisone injections, meloxicam and home exercise program.  She rates her pain as 8 out of 10.  She has difficulty walking any sort of distance or going downstairs.  She would like to proceed with total knee arthroplasty which is indicated at this time.    Right knee:  AAOx3, NAD, walks with a moderate antalgic gait  Varus allignment  Range of motion lacks 10 degrees of full extension and flexes to 110 degrees  Stable to varus/valgus/anterior/posterior stress through out the range of motion  Slight laxity with varus stress  Diffuse medial  joint line tenderness to palpation  Moderate effusion  SILT in a анна/saph/per/tib distribution  5/5 knee extension/df/pf/ehl  ½ dorsalis pedis and posterior tibial pulse  no popliteal lymphadenopathy  no other overlying lesions  mood: euthymic  Respirations non labored    Plain films were reviewed by myself in clinic today.  They have advanced osteoarthritis of their knee with significant joint space narrowing, bone on bone changes, underlying sclerosis and tricompartmental osteophytic change.    Patient is now failed a greater than 3-month trial of reasonable conservative treatment and wishes proceed with surgery which is indicated at this time.  Discussed the risk benefits alternatives to surgery.  All of their questions were answered.    Procedure: right total knee  Location: [facility]  ICD10: M17.11  CPT: 45783  Stay: outpatient  Equipment: [facility]    I talked with the patient at length about risks, limitations, benefits and alternatives to total knee replacement today. I reviewed concerns about implant wear, loosening, breakage, infection and infection prophylaxis, DVT, PE, death and other medical and anesthetic complications of surgery. We talked about the potential  for persistent pain following surgery since there are many possible causes for knee and leg pain. The patient was advised that knee replacement will only relieve pain that is coming from the knee. We talked about limited range of motion following knee replacement and the importance of physical therapy and their motivation. We talked about improvements in pain management post-operatively and our accelerated rehab program. We talked about wound healing issues and neurovascular complications of surgery. I reviewed functional and activity restrictions in detail. We discussed the possible need for a homologous blood transfusion. We discussed the fact that many of our patients are able to go home in 1 day or less depending on their health, mobility, pre-op preparation, individual home situation and personal preference.  The patient has identified their personal goals of their joint replacement surgery and recovery and we have discussed them. These are documented in our [employer] patient engagement platform. In addition, we have discussed the advantages and disadvantages of various implant and fixation options, as well as various surgical approaches.  The basic concepts of the joint replacement procedure has been reviewed with the patient and the patient has been provided the opportunity to see an actual implant either in the office or in our pre-op education class.  All of the patients questions were answered. The patient can call my office to schedule surgery and the pre-op teaching class. I told the patient that they should contact their primary care physician to discuss fitness for surgery.    This note was created using voice recognition software and was not corrected for typographical or grammatical errors.

## 2024-01-11 ENCOUNTER — PRE-ADMISSION TESTING (OUTPATIENT)
Dept: PREADMISSION TESTING | Facility: HOSPITAL | Age: 64
End: 2024-01-11
Payer: COMMERCIAL

## 2024-01-11 ENCOUNTER — APPOINTMENT (OUTPATIENT)
Dept: PRIMARY CARE | Facility: CLINIC | Age: 64
End: 2024-01-11
Payer: COMMERCIAL

## 2024-01-11 VITALS
RESPIRATION RATE: 16 BRPM | TEMPERATURE: 97.3 F | BODY MASS INDEX: 34.78 KG/M2 | OXYGEN SATURATION: 97 % | SYSTOLIC BLOOD PRESSURE: 148 MMHG | HEART RATE: 83 BPM | WEIGHT: 208.78 LBS | DIASTOLIC BLOOD PRESSURE: 83 MMHG | HEIGHT: 65 IN

## 2024-01-11 DIAGNOSIS — Z01.818 PREOPERATIVE TESTING: Primary | ICD-10-CM

## 2024-01-11 LAB
ALBUMIN SERPL BCP-MCNC: 4.5 G/DL (ref 3.4–5)
ALP SERPL-CCNC: 106 U/L (ref 33–136)
ALT SERPL W P-5'-P-CCNC: 226 U/L (ref 7–45)
ANION GAP SERPL CALC-SCNC: 14 MMOL/L (ref 10–20)
AST SERPL W P-5'-P-CCNC: 128 U/L (ref 9–39)
BASOPHILS # BLD AUTO: 0.03 X10*3/UL (ref 0–0.1)
BASOPHILS NFR BLD AUTO: 0.2 %
BILIRUB SERPL-MCNC: 0.9 MG/DL (ref 0–1.2)
BUN SERPL-MCNC: 15 MG/DL (ref 6–23)
CALCIUM SERPL-MCNC: 10.7 MG/DL (ref 8.6–10.3)
CHLORIDE SERPL-SCNC: 99 MMOL/L (ref 98–107)
CO2 SERPL-SCNC: 26 MMOL/L (ref 21–32)
CREAT SERPL-MCNC: 0.58 MG/DL (ref 0.5–1.05)
EGFRCR SERPLBLD CKD-EPI 2021: >90 ML/MIN/1.73M*2
EOSINOPHIL # BLD AUTO: 0.23 X10*3/UL (ref 0–0.7)
EOSINOPHIL NFR BLD AUTO: 1.7 %
ERYTHROCYTE [DISTWIDTH] IN BLOOD BY AUTOMATED COUNT: 11.8 % (ref 11.5–14.5)
EST. AVERAGE GLUCOSE BLD GHB EST-MCNC: 272 MG/DL
GLUCOSE SERPL-MCNC: 315 MG/DL (ref 74–99)
HBA1C MFR BLD: 11.1 %
HCT VFR BLD AUTO: 47.8 % (ref 36–46)
HGB BLD-MCNC: 16.1 G/DL (ref 12–16)
IMM GRANULOCYTES # BLD AUTO: 0.02 X10*3/UL (ref 0–0.7)
IMM GRANULOCYTES NFR BLD AUTO: 0.2 % (ref 0–0.9)
LYMPHOCYTES # BLD AUTO: 2.64 X10*3/UL (ref 1.2–4.8)
LYMPHOCYTES NFR BLD AUTO: 19.9 %
MCH RBC QN AUTO: 30.5 PG (ref 26–34)
MCHC RBC AUTO-ENTMCNC: 33.7 G/DL (ref 32–36)
MCV RBC AUTO: 91 FL (ref 80–100)
MONOCYTES # BLD AUTO: 0.54 X10*3/UL (ref 0.1–1)
MONOCYTES NFR BLD AUTO: 4.1 %
NEUTROPHILS # BLD AUTO: 9.8 X10*3/UL (ref 1.2–7.7)
NEUTROPHILS NFR BLD AUTO: 73.9 %
NRBC BLD-RTO: ABNORMAL /100{WBCS}
PLATELET # BLD AUTO: 244 X10*3/UL (ref 150–450)
POTASSIUM SERPL-SCNC: 4.3 MMOL/L (ref 3.5–5.3)
PROT SERPL-MCNC: 7.1 G/DL (ref 6.4–8.2)
RBC # BLD AUTO: 5.28 X10*6/UL (ref 4–5.2)
SODIUM SERPL-SCNC: 135 MMOL/L (ref 136–145)
WBC # BLD AUTO: 13.3 X10*3/UL (ref 4.4–11.3)

## 2024-01-11 PROCEDURE — 80053 COMPREHEN METABOLIC PANEL: CPT

## 2024-01-11 PROCEDURE — 83036 HEMOGLOBIN GLYCOSYLATED A1C: CPT

## 2024-01-11 PROCEDURE — 99214 OFFICE O/P EST MOD 30 MIN: CPT | Performed by: NURSE PRACTITIONER

## 2024-01-11 PROCEDURE — 93005 ELECTROCARDIOGRAM TRACING: CPT

## 2024-01-11 PROCEDURE — 93010 ELECTROCARDIOGRAM REPORT: CPT | Performed by: INTERNAL MEDICINE

## 2024-01-11 PROCEDURE — 85025 COMPLETE CBC W/AUTO DIFF WBC: CPT

## 2024-01-11 PROCEDURE — 87081 CULTURE SCREEN ONLY: CPT

## 2024-01-11 PROCEDURE — 36415 COLL VENOUS BLD VENIPUNCTURE: CPT

## 2024-01-11 RX ORDER — CHLORHEXIDINE GLUCONATE ORAL RINSE 1.2 MG/ML
15 SOLUTION DENTAL AS NEEDED
Qty: 30 ML | Refills: 0 | Status: SHIPPED | OUTPATIENT
Start: 2024-01-11 | End: 2024-01-17 | Stop reason: WASHOUT

## 2024-01-11 RX ORDER — CYANOCOBALAMIN (VITAMIN B-12) 250 MCG
500 TABLET ORAL DAILY
COMMUNITY

## 2024-01-11 ASSESSMENT — PAIN - FUNCTIONAL ASSESSMENT: PAIN_FUNCTIONAL_ASSESSMENT: 0-10

## 2024-01-11 ASSESSMENT — PAIN DESCRIPTION - DESCRIPTORS: DESCRIPTORS: ACHING;SORE

## 2024-01-11 ASSESSMENT — PAIN SCALES - GENERAL: PAINLEVEL_OUTOF10: 7

## 2024-01-11 NOTE — CPM/PAT H&P
CPM/PAT Evaluation   Delaney Kaplan is a 63 y.o. female   Chief Complaint: knee pain having a knee replacement    HPI:  Patient is a 64 y/o alert and oriented female coming in for PAT for a scheduled Knee Replacement Total Cement Unilateral Attune Implant on 2/5/24 w/ Dr. Bray.  The patient reports she has intermittent 7/10 sharp stabbing knee pain with movement, walking and twisting.  She states she has had injections in the past.  Resting helps the pain.  Her knee does not swell and does not give out.  Patient denies chest pain, SOB, CALVILLO and NVDC.  Patient also denies Hx: DVT/PE.  Current medications were reviewed and a presurgical mediation schedule was provided.  She has no questions at this time.   Past Medical History:   Diagnosis Date    Acute upper respiratory infection, unspecified 06/02/2016    Acute URI    Chronic cough 04/27/2017    Persistent cough    Contusion of right hand, initial encounter 11/25/2014    Contusion of hand, right    Disorder of the skin and subcutaneous tissue, unspecified 12/09/2015    Skin lesion    Otalgia, unspecified ear 12/06/2017    Ear discomfort    Other abnormal glucose 12/06/2017    Elevated glucose    Other conditions influencing health status 08/14/2018    Uncontrolled type 2 diabetes mellitus with complication    Other conditions influencing health status 04/27/2017    History of cough    Other conditions influencing health status 05/20/2019    History of cough    Other specified disorders of ear, unspecified ear 10/28/2016    Ear popping    Other specified disorders of Eustachian tube, unspecified ear 10/28/2016    ETD (eustachian tube dysfunction)    Pain in right hand 11/25/2014    Hand pain, right    Pain in unspecified knee 02/03/2015    Joint pain, knee    Personal history of diseases of the skin and subcutaneous tissue 12/09/2015    History of psoriasis    Personal history of other diseases of the nervous system and sense organs 12/09/2015    History of  impacted cerumen    Personal history of other diseases of the nervous system and sense organs 2017    History of eustachian tube dysfunction    Personal history of other diseases of the respiratory system 2016    History of acute bronchitis    Personal history of other diseases of the respiratory system 2017    History of sinusitis    Personal history of other diseases of the respiratory system 10/04/2022    History of sinusitis    Personal history of other diseases of the respiratory system 2019    History of sinusitis    Personal history of other diseases of the respiratory system 2015    History of sinusitis    Personal history of other specified conditions 2020    History of nasal congestion    Personal history of other specified conditions 2020    History of persistent cough    Personal history of other specified conditions 2020    History of persistent cough    Personal history of other specified conditions 2017    History of persistent cough    Personal history of other specified conditions 2017    History of urinary frequency    Personal history of other specified conditions 2017    History of urinary incontinence    Personal history of other specified conditions 2017    History of urinary urgency    Proteinuria, unspecified 12/10/2017    Microalbuminuria    Ventral hernia without obstruction or gangrene 2017    Abdominal wall hernia      Past Surgical History:   Procedure Laterality Date     SECTION, CLASSIC  2014     Section    COLONOSCOPY  2017    Complete Colonoscopy        Allergies   Allergen Reactions    Latex Unknown    Latex, Natural Rubber Rash        Current Outpatient Medications on File Prior to Visit   Medication Sig Dispense Refill    amoxicillin (Amoxil) 500 mg capsule Take 2  500 mg capsules 2 times per day for 10 days 40 capsule 0    calcium carbonate-vitamin D3 500 mg-2.5 mcg (100  unit) tablet,chewable Chew.      cinnamon bark (CINNAMON ORAL)       docosahexaenoic acid/epa (FISH OIL ORAL)       lisinopril 2.5 mg tablet TAKE 1 TABLET BY MOUTH TWICE A DAY (Patient taking differently: Take 1 tablet (2.5 mg) by mouth once daily.) 60 tablet 11    meloxicam (Mobic) 7.5 mg tablet Take 1 tablet (7.5 mg) by mouth once daily. 90 each 3    pediatric multivitamin (Gummi Bear Multivitamin) tablet,chewable Chew.       No current facility-administered medications on file prior to visit.       Review of Systems   Musculoskeletal:  Positive for gait problem.        See hpi for details   All other systems reviewed and are negative.     Physical Exam  Vitals and nursing note reviewed.   Constitutional:       Appearance: Normal appearance.   HENT:      Head: Normocephalic and atraumatic.      Mouth/Throat:      Mouth: Mucous membranes are moist.      Pharynx: Oropharynx is clear.   Eyes:      Pupils: Pupils are equal, round, and reactive to light.   Cardiovascular:      Rate and Rhythm: Normal rate and regular rhythm.      Heart sounds: Normal heart sounds.   Pulmonary:      Effort: Pulmonary effort is normal.      Breath sounds: Normal breath sounds.   Abdominal:      General: Bowel sounds are normal.      Palpations: Abdomen is soft.   Musculoskeletal:      Cervical back: Normal range of motion and neck supple.      Comments: Antalgic gait   Skin:     General: Skin is warm and dry.   Neurological:      Mental Status: She is alert and oriented to person, place, and time.   Psychiatric:         Mood and Affect: Mood normal.         Behavior: Behavior normal.         Thought Content: Thought content normal.         Judgment: Judgment normal.        Airway  Has dental crowns  Ex smoker quit 1986 1.5ppd x 6 yrs  Rare alcohol  No drug use  Has disorientation and confusion with anesthesia  No family issues with anesthesia  No nickel, iodine or shellfish allergy    Has latex allergy    Airway  Vitals:    01/11/24 1013    BP: 148/83   Pulse: 83   Resp: 16   Temp: 36.3 °C (97.3 °F)   SpO2: 97%      Assessment and Plan:   Unilateral Primary Osteoarthritis Right Knee  Knee Replacement Total Cement Unilateral Attune Implant  Managed with meloxicam 7.5mg daily    Hypertension  Managed with lisinopril 2.5mg bid  Denies dizziness, lightheadedness, chest pain, pressure or palpitations, no sob    Diabetes Mellitus  Diet Controlled  Will check A1C today    ASA II  RCRI - 0 points  Class I Risk 3.9%  ADRIAN - 3 points moderate Risk for NAVID   NSQIP - Predicted length of stay 0 days  ARISCAT - 3 points Low Risk 1.6%  DASI 34.7 Points 7.01 Mets  AIMEE - 0.1%  JHFRAT - 4 points low risk for falls  Clearance - not indicated  PAT Testing - CBC, CMP, A1C, MRSA PCR, EKG    CHLORHEXIDINE .12% DENTAL RINSE E PRESCIRBED PER  INFECTION PREVENTION PROTOCOL. PATIENT EDUCATED   The patient was advised to call [employer] PAT if she does not receive the mouthwash    Brianna webster/ Dr. Bray notified via secure chat on 1/11/24 @ 12:28pm of patient's elevated WBC count of 13.3  Also notified on 1/12/24 of A1C of 11.1 ast 128 alt 226 and glucose of 315. Patient's surgery will be cancelled  Face to Face patient contact time 20 minutes    NOAH Ware-CNP 1/11/2024 10:15 AM

## 2024-01-11 NOTE — PREPROCEDURE INSTRUCTIONS
Medication List            Accurate as of January 11, 2024 10:27 AM. Always use your most recent med list.                calcium carbonate-vitamin D3 500 mg-2.5 mcg (100 unit) tablet,chewable  Medication Adjustments for Surgery: Stop 7 days before surgery     chlorhexidine 0.12 % solution  Commonly known as: Peridex  Use 15 mL in the mouth or throat if needed for wound care for up to 2 doses.  Medication Adjustments for Surgery: Other (Comment)  Notes to patient: Take night before and morning of surgery     CINNAMON ORAL  Medication Adjustments for Surgery: Stop 7 days before surgery     cyanocobalamin 250 mcg tablet  Commonly known as: Vitamin B-12  Medication Adjustments for Surgery: Stop 7 days before surgery     FISH OIL ORAL  Medication Adjustments for Surgery: Stop 7 days before surgery     Gummi Bear Multivitamin tablet,chewable  Generic drug: pediatric multivitamin  Medication Adjustments for Surgery: Stop 7 days before surgery     lisinopril 2.5 mg tablet  TAKE 1 TABLET BY MOUTH TWICE A DAY  Medication Adjustments for Surgery: Stop 1 day before surgery     meloxicam 7.5 mg tablet  Commonly known as: Mobic  Take 1 tablet (7.5 mg) by mouth once daily.  Medication Adjustments for Surgery: Other (Comment)  Notes to patient: Stop 5 days prior to surgery                              NPO Instructions:    Do not eat any food after midnight the night before your surgery/procedure.    Additional Instructions:     Seven/Six Days before Surgery:  Review your medication instructions, stop indicated medications  Five Days before Surgery:  Review your medication instructions, stop indicated medications  Three Days before Surgery:  Review your medication instructions, stop indicated medications  The Day before Surgery:  Review your medication instructions, stop indicated medications  You will be contacted regarding the time of your arrival to facility and surgery time  Do not eat any food after Midnight  Day of  Surgery:  Review your medication instructions, take indicated medications  Wear  comfortable loose fitting clothing  Do not use moisturizers, creams, lotions or perfume  All jewelry and valuables should be left at home    Follow instructions given and explained for CHG wash and mouth rinse given in Preadmission Testing.

## 2024-01-12 LAB
ATRIAL RATE: 80 BPM
P AXIS: 60 DEGREES
P OFFSET: 197 MS
P ONSET: 143 MS
PR INTERVAL: 140 MS
Q ONSET: 213 MS
QRS COUNT: 13 BEATS
QRS DURATION: 82 MS
QT INTERVAL: 384 MS
QTC CALCULATION(BAZETT): 442 MS
QTC FREDERICIA: 423 MS
R AXIS: -9 DEGREES
T AXIS: 32 DEGREES
T OFFSET: 405 MS
VENTRICULAR RATE: 80 BPM

## 2024-01-14 LAB — STAPHYLOCOCCUS SPEC CULT: ABNORMAL

## 2024-01-16 NOTE — PROGRESS NOTES
Subjective        Delaney Kaplan. Is 63 y.o.. female. Here for follow up office visit-       Chief Complaint   Patient presents with    Diabetes     Wants to discuss her options for better control.    Immunizations     Declines flu vaccine.       HPI:        Follow up for DM- poor control- Hgba1c 11.0 - last checked 2021 and was 7 at that time         Had planned total knee replacement with Dr Haas           Sees eye doctor yearly        Has seen nutrition and diabetic education - declines seeing again          On lisinopril      Pt hesitant to start Medication at this time     Want to try diet and exercise     Pt wants to consider natural supplements- DNJ for 5 weeks and berberine         C/o LUE numb /ting on and off         Pre-Admission Testing on 01/11/2024   Component Date Value Ref Range Status    Ventricular Rate 01/11/2024 80  BPM Final    Atrial Rate 01/11/2024 80  BPM Final    ND Interval 01/11/2024 140  ms Final    QRS Duration 01/11/2024 82  ms Final    QT Interval 01/11/2024 384  ms Final    QTC Calculation(Bazett) 01/11/2024 442  ms Final    P Axis 01/11/2024 60  degrees Final    R Axis 01/11/2024 -9  degrees Final    T Axis 01/11/2024 32  degrees Final    QRS Count 01/11/2024 13  beats Final    Q Onset 01/11/2024 213  ms Final    P Onset 01/11/2024 143  ms Final    P Offset 01/11/2024 197  ms Final    T Offset 01/11/2024 405  ms Final    QTC Fredericia 01/11/2024 423  ms Final    Staph/MRSA Screen Culture 01/11/2024 Methicillin Susceptible Staphylococcus aureus (MSSA) (A)   Final    WBC 01/11/2024 13.3 (H)  4.4 - 11.3 x10*3/uL Final    nRBC 01/11/2024    Final    Not Measured    RBC 01/11/2024 5.28 (H)  4.00 - 5.20 x10*6/uL Final    Hemoglobin 01/11/2024 16.1 (H)  12.0 - 16.0 g/dL Final    Hematocrit 01/11/2024 47.8 (H)  36.0 - 46.0 % Final    MCV 01/11/2024 91  80 - 100 fL Final    MCH 01/11/2024 30.5  26.0 - 34.0 pg Final    MCHC 01/11/2024 33.7  32.0 - 36.0 g/dL Final    RDW 01/11/2024 11.8   11.5 - 14.5 % Final    Platelets 01/11/2024 244  150 - 450 x10*3/uL Final    Neutrophils % 01/11/2024 73.9  40.0 - 80.0 % Final    Immature Granulocytes %, Automated 01/11/2024 0.2  0.0 - 0.9 % Final    Immature Granulocyte Count (IG) includes promyelocytes, myelocytes and metamyelocytes but does not include bands. Percent differential counts (%) should be interpreted in the context of the absolute cell counts (cells/UL).    Lymphocytes % 01/11/2024 19.9  13.0 - 44.0 % Final    Monocytes % 01/11/2024 4.1  2.0 - 10.0 % Final    Eosinophils % 01/11/2024 1.7  0.0 - 6.0 % Final    Basophils % 01/11/2024 0.2  0.0 - 2.0 % Final    Neutrophils Absolute 01/11/2024 9.80 (H)  1.20 - 7.70 x10*3/uL Final    Percent differential counts (%) should be interpreted in the context of the absolute cell counts (cells/uL).    Immature Granulocytes Absolute, Au* 01/11/2024 0.02  0.00 - 0.70 x10*3/uL Final    Lymphocytes Absolute 01/11/2024 2.64  1.20 - 4.80 x10*3/uL Final    Monocytes Absolute 01/11/2024 0.54  0.10 - 1.00 x10*3/uL Final    Eosinophils Absolute 01/11/2024 0.23  0.00 - 0.70 x10*3/uL Final    Basophils Absolute 01/11/2024 0.03  0.00 - 0.10 x10*3/uL Final    Glucose 01/11/2024 315 (H)  74 - 99 mg/dL Final    Sodium 01/11/2024 135 (L)  136 - 145 mmol/L Final    Potassium 01/11/2024 4.3  3.5 - 5.3 mmol/L Final    Chloride 01/11/2024 99  98 - 107 mmol/L Final    Bicarbonate 01/11/2024 26  21 - 32 mmol/L Final    Anion Gap 01/11/2024 14  10 - 20 mmol/L Final    Urea Nitrogen 01/11/2024 15  6 - 23 mg/dL Final    Creatinine 01/11/2024 0.58  0.50 - 1.05 mg/dL Final    eGFR 01/11/2024 >90  >60 mL/min/1.73m*2 Final    Calculations of estimated GFR are performed using the 2021 CKD-EPI Study Refit equation without the race variable for the IDMS-Traceable creatinine methods.  https://jasn.asnjournals.org/content/early/2021/09/22/ASN.5468481269    Calcium 01/11/2024 10.7 (H)  8.6 - 10.3 mg/dL Final    Albumin 01/11/2024 4.5  3.4 - 5.0  g/dL Final    Alkaline Phosphatase 01/11/2024 106  33 - 136 U/L Final    Total Protein 01/11/2024 7.1  6.4 - 8.2 g/dL Final    AST 01/11/2024 128 (H)  9 - 39 U/L Final    Bilirubin, Total 01/11/2024 0.9  0.0 - 1.2 mg/dL Final    ALT 01/11/2024 226 (H)  7 - 45 U/L Final    Patients treated with Sulfasalazine may generate falsely decreased results for ALT.    Hemoglobin A1C 01/11/2024 11.1 (H)  See below % Final    Estimated Average Glucose 01/11/2024 272  Not Established mg/dL Final         REVIEW OF SYSTEMS:        Objective      Vitals:    01/17/24 0809   BP: 112/74   BP Location: Left arm   Patient Position: Sitting   BP Cuff Size: Large adult   Pulse: 69   Temp: 36.9 °C (98.4 °F)   TempSrc: Temporal   SpO2: 96%   Weight: 95 kg (209 lb 6.4 oz)       PHYSICAL:      Patient is alert and oriented x 3 , NAD  HEAD- normocephalic and atraumatic   EYES-conjunctiva-normal, lids - normal  EARS/NOSE- normal external exam   NECK-supple,FROM  CV- RRR without murmur  PULM- CTA bilaterally, normal respiratory effort  RESPIRATORY EFFORT- normal , no retractions or nasal flaring   ABD- normoactive BS's   EXT- no edema,NT  SKIN- no abnormal skin lesions noted  NEURO- no focal deficits  PSYCH- pleasant, normal judgement and insight      BP Readings from Last 3 Encounters:   01/17/24 112/74   01/11/24 148/83   12/07/23 120/78             Wt Readings from Last 3 Encounters:   01/17/24 95 kg (209 lb 6.4 oz)   01/11/24 94.7 kg (208 lb 12.4 oz)   12/07/23 96.7 kg (213 lb 3.2 oz)           BMI Readings from Last 3 Encounters:   01/17/24 34.85 kg/m²   01/11/24 34.74 kg/m²   12/07/23 35.48 kg/m²               The number and complexity of problems addressed is considered moderate.  The amount and/or complexity of data reviewed and analyzed is considered moderate. The risk of complications and/or morbidity/mortality of patient is considered moderate. Overall, this patient encounter is considered a moderate risk  visit.          Assessment/Plan      Problem List Items Addressed This Visit          Active Problems    Class 1 obesity with body mass index (BMI) of 34.0 to 34.9 in adult (Chronic)    Diabetes (CMS/HCC) - Primary    Relevant Orders    EMG & nerve conduction    Follow Up In [facility] - PCP - Established    Elevated BP without diagnosis of hypertension (Chronic)    Numbness and tingling of left upper extremity (Chronic)    Relevant Orders    EMG & nerve conduction       Orders Placed This Encounter   Procedures    EMG & nerve conduction         Diet and exercise       Patient wishes to hold on medication      Increasing water       Schedule NCT/EMG      Follow up 3 months and recheck Hgba1c

## 2024-01-17 ENCOUNTER — OFFICE VISIT (OUTPATIENT)
Dept: PRIMARY CARE | Facility: CLINIC | Age: 64
End: 2024-01-17
Payer: COMMERCIAL

## 2024-01-17 VITALS
DIASTOLIC BLOOD PRESSURE: 74 MMHG | SYSTOLIC BLOOD PRESSURE: 112 MMHG | BODY MASS INDEX: 34.85 KG/M2 | OXYGEN SATURATION: 96 % | TEMPERATURE: 98.4 F | HEART RATE: 69 BPM | WEIGHT: 209.4 LBS

## 2024-01-17 DIAGNOSIS — R03.0 ELEVATED BP WITHOUT DIAGNOSIS OF HYPERTENSION: Chronic | ICD-10-CM

## 2024-01-17 DIAGNOSIS — R20.2 NUMBNESS AND TINGLING OF LEFT UPPER EXTREMITY: Chronic | ICD-10-CM

## 2024-01-17 DIAGNOSIS — E13.9 DIABETES MELLITUS OF OTHER TYPE WITHOUT COMPLICATION, UNSPECIFIED WHETHER LONG TERM INSULIN USE (MULTI): Primary | Chronic | ICD-10-CM

## 2024-01-17 DIAGNOSIS — E66.9 CLASS 1 OBESITY WITH BODY MASS INDEX (BMI) OF 34.0 TO 34.9 IN ADULT, UNSPECIFIED OBESITY TYPE, UNSPECIFIED WHETHER SERIOUS COMORBIDITY PRESENT: Chronic | ICD-10-CM

## 2024-01-17 DIAGNOSIS — R20.0 NUMBNESS AND TINGLING OF LEFT UPPER EXTREMITY: Chronic | ICD-10-CM

## 2024-01-17 PROBLEM — E66.811 CLASS 1 OBESITY WITH BODY MASS INDEX (BMI) OF 34.0 TO 34.9 IN ADULT: Chronic | Status: ACTIVE | Noted: 2024-01-17

## 2024-01-17 PROCEDURE — 99214 OFFICE O/P EST MOD 30 MIN: CPT | Performed by: FAMILY MEDICINE

## 2024-01-17 PROCEDURE — 1036F TOBACCO NON-USER: CPT | Performed by: FAMILY MEDICINE

## 2024-01-17 PROCEDURE — 3008F BODY MASS INDEX DOCD: CPT | Performed by: FAMILY MEDICINE

## 2024-01-17 PROCEDURE — 3078F DIAST BP <80 MM HG: CPT | Performed by: FAMILY MEDICINE

## 2024-01-17 PROCEDURE — 3046F HEMOGLOBIN A1C LEVEL >9.0%: CPT | Performed by: FAMILY MEDICINE

## 2024-01-17 PROCEDURE — 3074F SYST BP LT 130 MM HG: CPT | Performed by: FAMILY MEDICINE

## 2024-01-25 ENCOUNTER — HOSPITAL ENCOUNTER (OUTPATIENT)
Dept: NEUROLOGY | Facility: CLINIC | Age: 64
Discharge: HOME | End: 2024-01-25
Payer: COMMERCIAL

## 2024-01-25 DIAGNOSIS — R20.0 NUMBNESS AND TINGLING OF LEFT UPPER EXTREMITY: Chronic | ICD-10-CM

## 2024-01-25 DIAGNOSIS — E13.9 DIABETES MELLITUS OF OTHER TYPE WITHOUT COMPLICATION, UNSPECIFIED WHETHER LONG TERM INSULIN USE (MULTI): Chronic | ICD-10-CM

## 2024-01-25 DIAGNOSIS — R20.2 NUMBNESS AND TINGLING OF LEFT UPPER EXTREMITY: Chronic | ICD-10-CM

## 2024-01-25 PROCEDURE — 95911 NRV CNDJ TEST 9-10 STUDIES: CPT | Performed by: PSYCHIATRY & NEUROLOGY

## 2024-01-25 PROCEDURE — 95886 MUSC TEST DONE W/N TEST COMP: CPT | Performed by: PSYCHIATRY & NEUROLOGY

## 2024-01-26 DIAGNOSIS — G56.22 ULNAR NEUROPATHY AT ELBOW, LEFT: Primary | ICD-10-CM

## 2024-01-26 DIAGNOSIS — G56.02 CARPAL TUNNEL SYNDROME OF LEFT WRIST: ICD-10-CM

## 2024-02-15 ENCOUNTER — TELEPHONE (OUTPATIENT)
Dept: ORTHOPEDIC SURGERY | Facility: HOSPITAL | Age: 64
End: 2024-02-15
Payer: COMMERCIAL

## 2024-02-15 NOTE — TELEPHONE ENCOUNTER
"Spoke with Delaney to let her know that Dr. Key has to cancel his [facility] and that her appointment needed to be rescheduled. She tells me that her knee replacement with Dr. Bray was cancelled due to her blood sugar being too high.  She states that she thinks that it was in the 330s and that her A1c was around 11.  She wanted to discuss getting a cortisone injection or if there were any other options.  I told her that she could do that, but that with high blood sugar, Dr. Key might not do an injection either.  She understood.  She said she will get her bloodwork redone in April.  She asked about the \"QC kinetics\" that she has heard advertised.  I said that I believed that was a PRP type of treatment and that Dr. Key typically doesn't do that in the office but if he thought it might be a treatment option for her he would refer her to one of his partners that does PRP. CT  "

## 2024-02-20 ENCOUNTER — APPOINTMENT (OUTPATIENT)
Dept: ORTHOPEDIC SURGERY | Facility: CLINIC | Age: 64
End: 2024-02-20
Payer: COMMERCIAL

## 2024-03-05 ENCOUNTER — OFFICE VISIT (OUTPATIENT)
Dept: ORTHOPEDIC SURGERY | Facility: CLINIC | Age: 64
End: 2024-03-05
Payer: COMMERCIAL

## 2024-03-05 DIAGNOSIS — M17.11 PRIMARY OSTEOARTHRITIS OF RIGHT KNEE: Primary | ICD-10-CM

## 2024-03-05 PROCEDURE — 3008F BODY MASS INDEX DOCD: CPT | Performed by: ORTHOPAEDIC SURGERY

## 2024-03-05 PROCEDURE — 3046F HEMOGLOBIN A1C LEVEL >9.0%: CPT | Performed by: ORTHOPAEDIC SURGERY

## 2024-03-05 PROCEDURE — 1036F TOBACCO NON-USER: CPT | Performed by: ORTHOPAEDIC SURGERY

## 2024-03-05 PROCEDURE — 99213 OFFICE O/P EST LOW 20 MIN: CPT | Performed by: ORTHOPAEDIC SURGERY

## 2024-03-05 NOTE — PROGRESS NOTES
Patient returns to see me today for her knees she was set up to have a total knee replacement however her A1c was 11.1 she is currently working on that.  She does have a wedding coming up in July so she is trying to figure out if there are any other intervention that she can do to feel better for the wedding we talked to her about her options were to get her set up to see one of our nonsurgical partners for consideration of either PRP versus viscosupplementation.  Will plan to see her back as needed

## 2024-03-21 ENCOUNTER — APPOINTMENT (OUTPATIENT)
Dept: ORTHOPEDIC SURGERY | Facility: CLINIC | Age: 64
End: 2024-03-21
Payer: COMMERCIAL

## 2024-03-25 ENCOUNTER — OFFICE VISIT (OUTPATIENT)
Dept: ORTHOPEDIC SURGERY | Facility: HOSPITAL | Age: 64
End: 2024-03-25
Payer: COMMERCIAL

## 2024-03-25 VITALS — HEIGHT: 65 IN | BODY MASS INDEX: 33.32 KG/M2 | WEIGHT: 200 LBS

## 2024-03-25 DIAGNOSIS — E08.65 DIABETES MELLITUS DUE TO UNDERLYING CONDITION WITH HYPERGLYCEMIA, WITHOUT LONG-TERM CURRENT USE OF INSULIN (MULTI): ICD-10-CM

## 2024-03-25 DIAGNOSIS — M17.11 PRIMARY OSTEOARTHRITIS OF RIGHT KNEE: Primary | ICD-10-CM

## 2024-03-25 PROCEDURE — 20610 DRAIN/INJ JOINT/BURSA W/O US: CPT | Performed by: FAMILY MEDICINE

## 2024-03-25 PROCEDURE — 2500000004 HC RX 250 GENERAL PHARMACY W/ HCPCS (ALT 636 FOR OP/ED): Performed by: FAMILY MEDICINE

## 2024-03-25 PROCEDURE — 3046F HEMOGLOBIN A1C LEVEL >9.0%: CPT | Performed by: FAMILY MEDICINE

## 2024-03-25 PROCEDURE — 4010F ACE/ARB THERAPY RXD/TAKEN: CPT | Performed by: FAMILY MEDICINE

## 2024-03-25 PROCEDURE — 99214 OFFICE O/P EST MOD 30 MIN: CPT | Performed by: FAMILY MEDICINE

## 2024-03-25 PROCEDURE — 1036F TOBACCO NON-USER: CPT | Performed by: FAMILY MEDICINE

## 2024-03-25 PROCEDURE — 2500000005 HC RX 250 GENERAL PHARMACY W/O HCPCS: Performed by: FAMILY MEDICINE

## 2024-03-25 PROCEDURE — 3008F BODY MASS INDEX DOCD: CPT | Performed by: FAMILY MEDICINE

## 2024-03-25 RX ORDER — KETOROLAC TROMETHAMINE 30 MG/ML
30 INJECTION, SOLUTION INTRAMUSCULAR; INTRAVENOUS
Status: COMPLETED | OUTPATIENT
Start: 2024-03-25 | End: 2024-03-25

## 2024-03-25 RX ORDER — LISINOPRIL 2.5 MG/1
2.5 TABLET ORAL DAILY
COMMUNITY

## 2024-03-25 RX ORDER — LIDOCAINE HYDROCHLORIDE 10 MG/ML
4 INJECTION INFILTRATION; PERINEURAL
Status: COMPLETED | OUTPATIENT
Start: 2024-03-25 | End: 2024-03-25

## 2024-03-25 RX ADMIN — KETOROLAC TROMETHAMINE 30 MG: 30 INJECTION, SOLUTION INTRAMUSCULAR at 08:43

## 2024-03-25 RX ADMIN — LIDOCAINE HYDROCHLORIDE 4 ML: 10 INJECTION, SOLUTION INFILTRATION; PERINEURAL at 08:43

## 2024-03-25 ASSESSMENT — PAIN DESCRIPTION - DESCRIPTORS: DESCRIPTORS: DULL;SHARP

## 2024-03-25 ASSESSMENT — PAIN SCALES - GENERAL: PAINLEVEL_OUTOF10: 10 - WORST POSSIBLE PAIN

## 2024-03-25 ASSESSMENT — PAIN - FUNCTIONAL ASSESSMENT: PAIN_FUNCTIONAL_ASSESSMENT: 0-10

## 2024-03-25 NOTE — LETTER
March 25, 2024     Yuri Key MD  [address] [place] [address] [place] [address]    Patient: Delaney Kaplan   YOB: 1960   Date of Visit: 3/25/2024       Dear Dr. Yuri Key MD:    Thank you for referring Delaney Kaplan to me for evaluation. Below are my notes for this consultation.  If you have questions, please do not hesitate to call me. I look forward to following your patient along with you.       Sincerely,     Don Bah, DO      CC: Elizabeth Mandujano MD  ______________________________________________________________________________________    ** Please excuse any errors in grammar or translation related to this dictation. Voice recognition software was utilized to prepare this document. **    Assessment & Plan:  Patient here for nonoperative management of right knee pain due to exacerbation of knee arthritis.  She was scheduled to have knee replacement surgery in February with Dr. Bray however this was canceled due to elevated hemoglobin A1c to 11%.  For this reason she was referred here today to discuss nonoperative management options.  We discussed the use of oral or topical analgesics which she is currently using and provide minimal temporary relief.  We discussed Toradol injection, hyaluronic acid injection, and Zilretta injections and how they differ from one another along with risk and benefits.  Due to her elevated hemoglobin A1c not a candidate for regular Kenalog injection.  Given her degree of pain she is not currently elected to proceed with intra-articular Toradol injection today.  She will see her response to this injection.  If it is not helping we will contact office and notify how she would like to proceed with obtaining prior authorization for either hyaluronic acid injection or a Zilretta injection.  Return precautions given.  Informed patient that if the injection does help it can be repeated again in 3 months.  If reschedule surgery cannot have it performed within 90 days  of a knee injection.  All questions answered and patient agrees to plan.    Copy of today's encounter will be sent to Dr. Key.    Chief complaint:  Right knee pain    HPI:  62 y/o patient, hx of DM with HbA1c 11.1% on 1/11/24,   presents with right knee pain.  This complaint has been ongoing for 1 year.  No mechanism of injury reported at onset. Symptoms have progressively worsened with time. Pain is located at the medial knee. It is associated with sensation of stiffness, loss of joint motion, popping sensation.   Symptoms are aggravated by standing and ambulation. To date, patient has tried a variety of treatments to include cortisone injections, and meloxicam. Previously saw Dr. Key for this with last visit being 3/5/24. Last steroid injection completed 8/29/24. Currently using ibuprofen for symptom control. Denies previous surgery to this site.     Patient was referred here evaluation of nonoperative management options for knee arthritis.    Exam:  RIGHT Knee examined. No effusion, ecchymosis, or erythema.  AROM from 5 to 120 deg with 5/5 strength. SILT overlying knee. Motion crepitus present. Tenderness along medial joint line.  No popliteal mass palpated. Negative anterior and posterior drawer.  No laxity to varus or valgus stress at 0 or 30 deg.  No patellar apprehension.      General Exam:  Constitutional - NAD, AAO x 3, conversing appropriately.  HEENT- Normocephalic and atraumatic. EOMI, PERRLA, No scleral icterus. No facial deformities. Hearing grossly normal.  Lungs - Breathing non-labored with normal rate. No accessory muscle use.  CV - Extremities warm and well-perfused, brisk capillary refill present.   Neuro - CN II-XII grossly intact.    Results:  X-ray right knee obtained December 2023 independent interpreted as moderate to advanced degenerative changes affecting the medial and patellofemoral compartments.    Reviewed referral note dated 3/5/2024.    Procedure:   Patient ID: Delaney Kaplan is  a 63 y.o. female.    L Inj/Asp: R knee on 3/25/2024 8:43 AM  Indications: pain  Details: 25 G needle, anteromedial approach  Medications: 4 mL lidocaine 10 mg/mL (1 %); 30 mg ketorolac 60 mg/2 mL  Outcome: tolerated well, no immediate complications    Procedure risk factors to include increased pain, bleeding, infection, neurovascular injury, soft tissue injury, transient elevation of blood pressure, and adverse reaction to medication were discussed with the patient. Patient understands there is a moderate risk of morbidity from undergoing the procedure.  Procedure, treatment alternatives, risks and benefits explained, specific risks discussed. Consent was given by the patient. Immediately prior to procedure a time out was called to verify the correct patient, procedure, equipment, support staff and site/side marked as required. Patient was prepped and draped in the usual sterile fashion.

## 2024-03-25 NOTE — PROGRESS NOTES
** Please excuse any errors in grammar or translation related to this dictation. Voice recognition software was utilized to prepare this document. **    Assessment & Plan:  Patient here for nonoperative management of right knee pain due to exacerbation of knee arthritis.  She was scheduled to have knee replacement surgery in February with Dr. Bray however this was canceled due to elevated hemoglobin A1c to 11%.  For this reason she was referred here today to discuss nonoperative management options.  We discussed the use of oral or topical analgesics which she is currently using and provide minimal temporary relief.  We discussed Toradol injection, hyaluronic acid injection, and Zilretta injections and how they differ from one another along with risk and benefits.  Due to her elevated hemoglobin A1c not a candidate for regular Kenalog injection.  Given her degree of pain she is not currently elected to proceed with intra-articular Toradol injection today.  She will see her response to this injection.  If it is not helping we will contact office and notify how she would like to proceed with obtaining prior authorization for either hyaluronic acid injection or a Zilretta injection.  Return precautions given.  Informed patient that if the injection does help it can be repeated again in 3 months.  If reschedule surgery cannot have it performed within 90 days of a knee injection.  All questions answered and patient agrees to plan.    Copy of today's encounter will be sent to Dr. Key.    Chief complaint:  Right knee pain    HPI:  64 y/o patient, hx of DM with HbA1c 11.1% on 1/11/24,   presents with right knee pain.  This complaint has been ongoing for 1 year.  No mechanism of injury reported at onset. Symptoms have progressively worsened with time. Pain is located at the medial knee. It is associated with sensation of stiffness, loss of joint motion, popping sensation.   Symptoms are aggravated by standing and  ambulation. To date, patient has tried a variety of treatments to include cortisone injections, and meloxicam. Previously saw Dr. Key for this with last visit being 3/5/24. Last steroid injection completed 8/29/24. Currently using ibuprofen for symptom control. Denies previous surgery to this site.     Patient was referred here evaluation of nonoperative management options for knee arthritis.    Exam:  RIGHT Knee examined. No effusion, ecchymosis, or erythema.  AROM from 5 to 120 deg with 5/5 strength. SILT overlying knee. Motion crepitus present. Tenderness along medial joint line.  No popliteal mass palpated. Negative anterior and posterior drawer.  No laxity to varus or valgus stress at 0 or 30 deg.  No patellar apprehension.      General Exam:  Constitutional - NAD, AAO x 3, conversing appropriately.  HEENT- Normocephalic and atraumatic. EOMI, PERRLA, No scleral icterus. No facial deformities. Hearing grossly normal.  Lungs - Breathing non-labored with normal rate. No accessory muscle use.  CV - Extremities warm and well-perfused, brisk capillary refill present.   Neuro - CN II-XII grossly intact.    Results:  X-ray right knee obtained December 2023 independent interpreted as moderate to advanced degenerative changes affecting the medial and patellofemoral compartments.    Reviewed referral note dated 3/5/2024.    Procedure:   Patient ID: Delaney Kaplan is a 63 y.o. female.    L Inj/Asp: R knee on 3/25/2024 8:43 AM  Indications: pain  Details: 25 G needle, anteromedial approach  Medications: 4 mL lidocaine 10 mg/mL (1 %); 30 mg ketorolac 60 mg/2 mL  Outcome: tolerated well, no immediate complications    Procedure risk factors to include increased pain, bleeding, infection, neurovascular injury, soft tissue injury, transient elevation of blood pressure, and adverse reaction to medication were discussed with the patient. Patient understands there is a moderate risk of morbidity from undergoing the  procedure.  Procedure, treatment alternatives, risks and benefits explained, specific risks discussed. Consent was given by the patient. Immediately prior to procedure a time out was called to verify the correct patient, procedure, equipment, support staff and site/side marked as required. Patient was prepped and draped in the usual sterile fashion.

## 2024-04-15 NOTE — PROGRESS NOTES
Subjective        Delaney Kaplan. Is 63 y.o.. female. Here for follow up office visit-       Chief Complaint   Patient presents with    Follow-up    Diabetes       HPI:        Follow up for DM- poor control- pt declined Medication in January; ulnar neuropathy and CTS- pt was referred to ortho     Lifestyle changes only    Hgba1c was 11.1    Cr was normal    Has met with diabetic education      Pt stopped calcium pills     Pre-Admission Testing on 01/11/2024   Component Date Value Ref Range Status    Ventricular Rate 01/11/2024 80  BPM Final    Atrial Rate 01/11/2024 80  BPM Final    MN Interval 01/11/2024 140  ms Final    QRS Duration 01/11/2024 82  ms Final    QT Interval 01/11/2024 384  ms Final    QTC Calculation(Bazett) 01/11/2024 442  ms Final    P Axis 01/11/2024 60  degrees Final    R Axis 01/11/2024 -9  degrees Final    T Axis 01/11/2024 32  degrees Final    QRS Count 01/11/2024 13  beats Final    Q Onset 01/11/2024 213  ms Final    P Onset 01/11/2024 143  ms Final    P Offset 01/11/2024 197  ms Final    T Offset 01/11/2024 405  ms Final    QTC Fredericia 01/11/2024 423  ms Final    Glucose 01/11/2024 315 (H)  74 - 99 mg/dL Final    Sodium 01/11/2024 135 (L)  136 - 145 mmol/L Final    Potassium 01/11/2024 4.3  3.5 - 5.3 mmol/L Final    Chloride 01/11/2024 99  98 - 107 mmol/L Final    Bicarbonate 01/11/2024 26  21 - 32 mmol/L Final    Anion Gap 01/11/2024 14  10 - 20 mmol/L Final    Urea Nitrogen 01/11/2024 15  6 - 23 mg/dL Final    Creatinine 01/11/2024 0.58  0.50 - 1.05 mg/dL Final    eGFR 01/11/2024 >90  >60 mL/min/1.73m*2 Final    Calculations of estimated GFR are performed using the 2021 CKD-EPI Study Refit equation without the race variable for the IDMS-Traceable creatinine methods.  https://jasn.asnjournals.org/content/early/2021/09/22/ASN.3201409406    Calcium 01/11/2024 10.7 (H)  8.6 - 10.3 mg/dL Final    Albumin 01/11/2024 4.5  3.4 - 5.0 g/dL Final    Alkaline Phosphatase 01/11/2024 106  33 - 136 U/L  Final    Total Protein 01/11/2024 7.1  6.4 - 8.2 g/dL Final    AST 01/11/2024 128 (H)  9 - 39 U/L Final    Bilirubin, Total 01/11/2024 0.9  0.0 - 1.2 mg/dL Final    ALT 01/11/2024 226 (H)  7 - 45 U/L Final    Patients treated with Sulfasalazine may generate falsely decreased results for ALT.    Hemoglobin A1C 01/11/2024 11.1 (H)  See below % Final    Estimated Average Glucose 01/11/2024 272  Not Established mg/dL Final         REVIEW OF SYSTEMS:        Objective      Vitals:    04/17/24 0905   BP: 99/66   BP Location: Left arm   Patient Position: Sitting   BP Cuff Size: Large adult   Pulse: 81   Temp: 36.4 °C (97.6 °F)   TempSrc: Temporal   SpO2: 97%   Weight: 92.5 kg (204 lb)                       PHYSICAL:      Patient is alert and oriented x 3 , NAD  HEAD- normocephalic and atraumatic   EYES-conjunctiva-normal, lids - normal  EARS/NOSE- normal external exam   NECK-supple,FROM  CV- RRR without murmur  PULM- CTA bilaterally, normal respiratory effort  RESPIRATORY EFFORT- normal , no retractions or nasal flaring   ABD- normoactive BS's   EXT- no edema,NT  SKIN- no abnormal skin lesions noted  NEURO- no focal deficits  PSYCH- pleasant, normal judgement and insight      BP Readings from Last 3 Encounters:   04/17/24 99/66   01/17/24 112/74   01/11/24 148/83             Wt Readings from Last 3 Encounters:   04/17/24 92.5 kg (204 lb)   03/25/24 90.7 kg (200 lb)   01/17/24 95 kg (209 lb 6.4 oz)           BMI Readings from Last 3 Encounters:   04/17/24 33.95 kg/m²   03/25/24 33.28 kg/m²   01/17/24 34.85 kg/m²               The number and complexity of problems addressed is considered moderate.  The amount and/or complexity of data reviewed and analyzed is considered moderate. The risk of complications and/or morbidity/mortality of patient is considered moderate. Overall, this patient encounter is considered a moderate risk visit.          Assessment/Plan      Problem List Items Addressed This Visit          Active  Problems    Carpal tunnel syndrome of left wrist    Class 1 obesity with body mass index (BMI) of 34.0 to 34.9 in adult (Chronic)    Diabetes (Multi) - Primary    Relevant Orders    Hemoglobin A1C    Hypercholesterolemia (Chronic)    Relevant Orders    Hepatic Function Panel    Medication management (Chronic)    Relevant Orders    Vitamin D 25-Hydroxy,Total (for eval of Vitamin D levels)    Primary hypertension (Chronic)    Relevant Orders    Basic Metabolic Panel    Ulnar neuropathy at elbow, left    Vitamin D deficiency (Chronic)       Orders Placed This Encounter   Procedures    Hemoglobin A1C    Basic Metabolic Panel    Hepatic Function Panel    Vitamin D 25-Hydroxy,Total (for eval of Vitamin D levels)         Continue diet and exercise           Ordered lab- recheck Hgba1c      Follow up 3 months

## 2024-04-17 ENCOUNTER — OFFICE VISIT (OUTPATIENT)
Dept: PRIMARY CARE | Facility: CLINIC | Age: 64
End: 2024-04-17
Payer: COMMERCIAL

## 2024-04-17 ENCOUNTER — LAB (OUTPATIENT)
Dept: LAB | Facility: LAB | Age: 64
End: 2024-04-17
Payer: COMMERCIAL

## 2024-04-17 VITALS
TEMPERATURE: 97.6 F | DIASTOLIC BLOOD PRESSURE: 66 MMHG | WEIGHT: 204 LBS | HEART RATE: 81 BPM | SYSTOLIC BLOOD PRESSURE: 99 MMHG | OXYGEN SATURATION: 97 % | BODY MASS INDEX: 33.95 KG/M2

## 2024-04-17 DIAGNOSIS — I10 PRIMARY HYPERTENSION: Chronic | ICD-10-CM

## 2024-04-17 DIAGNOSIS — E13.9 DIABETES MELLITUS OF OTHER TYPE WITHOUT COMPLICATION, UNSPECIFIED WHETHER LONG TERM INSULIN USE (MULTI): Primary | Chronic | ICD-10-CM

## 2024-04-17 DIAGNOSIS — Z79.899 MEDICATION MANAGEMENT: Chronic | ICD-10-CM

## 2024-04-17 DIAGNOSIS — E78.00 HYPERCHOLESTEROLEMIA: Chronic | ICD-10-CM

## 2024-04-17 DIAGNOSIS — E55.9 VITAMIN D DEFICIENCY: Chronic | ICD-10-CM

## 2024-04-17 DIAGNOSIS — G56.22 ULNAR NEUROPATHY AT ELBOW, LEFT: Chronic | ICD-10-CM

## 2024-04-17 DIAGNOSIS — E66.9 CLASS 1 OBESITY WITH BODY MASS INDEX (BMI) OF 34.0 TO 34.9 IN ADULT, UNSPECIFIED OBESITY TYPE, UNSPECIFIED WHETHER SERIOUS COMORBIDITY PRESENT: Chronic | ICD-10-CM

## 2024-04-17 DIAGNOSIS — G56.02 CARPAL TUNNEL SYNDROME OF LEFT WRIST: Chronic | ICD-10-CM

## 2024-04-17 DIAGNOSIS — E13.9 DIABETES MELLITUS OF OTHER TYPE WITHOUT COMPLICATION, UNSPECIFIED WHETHER LONG TERM INSULIN USE (MULTI): Chronic | ICD-10-CM

## 2024-04-17 LAB
EST. AVERAGE GLUCOSE BLD GHB EST-MCNC: 206 MG/DL
HBA1C MFR BLD: 8.8 %

## 2024-04-17 PROCEDURE — 3008F BODY MASS INDEX DOCD: CPT | Performed by: FAMILY MEDICINE

## 2024-04-17 PROCEDURE — 3046F HEMOGLOBIN A1C LEVEL >9.0%: CPT | Performed by: FAMILY MEDICINE

## 2024-04-17 PROCEDURE — 3078F DIAST BP <80 MM HG: CPT | Performed by: FAMILY MEDICINE

## 2024-04-17 PROCEDURE — 82306 VITAMIN D 25 HYDROXY: CPT

## 2024-04-17 PROCEDURE — 80053 COMPREHEN METABOLIC PANEL: CPT

## 2024-04-17 PROCEDURE — 83036 HEMOGLOBIN GLYCOSYLATED A1C: CPT

## 2024-04-17 PROCEDURE — 82248 BILIRUBIN DIRECT: CPT

## 2024-04-17 PROCEDURE — 1036F TOBACCO NON-USER: CPT | Performed by: FAMILY MEDICINE

## 2024-04-17 PROCEDURE — 36415 COLL VENOUS BLD VENIPUNCTURE: CPT

## 2024-04-17 PROCEDURE — 4010F ACE/ARB THERAPY RXD/TAKEN: CPT | Performed by: FAMILY MEDICINE

## 2024-04-17 PROCEDURE — 99214 OFFICE O/P EST MOD 30 MIN: CPT | Performed by: FAMILY MEDICINE

## 2024-04-17 PROCEDURE — 3074F SYST BP LT 130 MM HG: CPT | Performed by: FAMILY MEDICINE

## 2024-04-18 LAB
25(OH)D3 SERPL-MCNC: 39 NG/ML (ref 30–100)
ALBUMIN SERPL BCP-MCNC: 4.5 G/DL (ref 3.4–5)
ALP SERPL-CCNC: 75 U/L (ref 33–136)
ALT SERPL W P-5'-P-CCNC: 63 U/L (ref 7–45)
ANION GAP SERPL CALC-SCNC: 17 MMOL/L (ref 10–20)
AST SERPL W P-5'-P-CCNC: 36 U/L (ref 9–39)
BILIRUB DIRECT SERPL-MCNC: 0.1 MG/DL (ref 0–0.3)
BILIRUB SERPL-MCNC: 0.8 MG/DL (ref 0–1.2)
BUN SERPL-MCNC: 21 MG/DL (ref 6–23)
CALCIUM SERPL-MCNC: 9.7 MG/DL (ref 8.6–10.6)
CHLORIDE SERPL-SCNC: 102 MMOL/L (ref 98–107)
CO2 SERPL-SCNC: 25 MMOL/L (ref 21–32)
CREAT SERPL-MCNC: 0.68 MG/DL (ref 0.5–1.05)
EGFRCR SERPLBLD CKD-EPI 2021: >90 ML/MIN/1.73M*2
GLUCOSE SERPL-MCNC: 255 MG/DL (ref 74–99)
POTASSIUM SERPL-SCNC: 4.5 MMOL/L (ref 3.5–5.3)
PROT SERPL-MCNC: 6.9 G/DL (ref 6.4–8.2)
SODIUM SERPL-SCNC: 139 MMOL/L (ref 136–145)

## 2024-05-07 ENCOUNTER — OFFICE VISIT (OUTPATIENT)
Dept: PRIMARY CARE | Facility: CLINIC | Age: 64
End: 2024-05-07
Payer: COMMERCIAL

## 2024-05-07 VITALS
HEART RATE: 70 BPM | TEMPERATURE: 97.6 F | BODY MASS INDEX: 33.95 KG/M2 | SYSTOLIC BLOOD PRESSURE: 109 MMHG | DIASTOLIC BLOOD PRESSURE: 74 MMHG | OXYGEN SATURATION: 96 % | WEIGHT: 204 LBS

## 2024-05-07 DIAGNOSIS — R35.0 URINARY FREQUENCY: Chronic | ICD-10-CM

## 2024-05-07 DIAGNOSIS — R30.0 DYSURIA: Primary | Chronic | ICD-10-CM

## 2024-05-07 PROCEDURE — 87186 SC STD MICRODIL/AGAR DIL: CPT

## 2024-05-07 PROCEDURE — 3074F SYST BP LT 130 MM HG: CPT | Performed by: FAMILY MEDICINE

## 2024-05-07 PROCEDURE — 87086 URINE CULTURE/COLONY COUNT: CPT

## 2024-05-07 PROCEDURE — 4010F ACE/ARB THERAPY RXD/TAKEN: CPT | Performed by: FAMILY MEDICINE

## 2024-05-07 PROCEDURE — 99214 OFFICE O/P EST MOD 30 MIN: CPT | Performed by: FAMILY MEDICINE

## 2024-05-07 PROCEDURE — 1036F TOBACCO NON-USER: CPT | Performed by: FAMILY MEDICINE

## 2024-05-07 PROCEDURE — 3078F DIAST BP <80 MM HG: CPT | Performed by: FAMILY MEDICINE

## 2024-05-07 PROCEDURE — 3008F BODY MASS INDEX DOCD: CPT | Performed by: FAMILY MEDICINE

## 2024-05-07 PROCEDURE — 3052F HG A1C>EQUAL 8.0%<EQUAL 9.0%: CPT | Performed by: FAMILY MEDICINE

## 2024-05-07 RX ORDER — NITROFURANTOIN 25; 75 MG/1; MG/1
100 CAPSULE ORAL 2 TIMES DAILY
Qty: 14 CAPSULE | Refills: 0 | Status: SHIPPED | OUTPATIENT
Start: 2024-05-07 | End: 2024-05-14

## 2024-05-07 NOTE — PROGRESS NOTES
Subjective        Delaney Kaplan is a 63 y.o. female who presents for      HPI:          Chief Complaint   Patient presents with    UTI     Frequent urinating with some discomfort and no blood noted     What concern/ problem/pain/symptom  brings you here today?  Possible UTI    how long has pt had sxs?  About 3 days    describe symptoms- a little discomfort right side front and having to urinate frequently and urine is clear in color  Fever- no  Nausea- no  Vomiting- no  Blood in urine- no  Pain- a little discomfort on right side front      how often do symptoms occur? often      has pt tried anything for current symptoms, including medications (OTC or prescription)  ?  no      what makes symptoms worse? nothing      has pt been seen recently for this problem ( within past 2-3 weeks) ?  no  if yes- where?    by who?    what treatment was provided?                  Social History     Tobacco Use   Smoking Status Former    Current packs/day: 0.00    Average packs/day: 1.5 packs/day for 6.0 years (9.0 ttl pk-yrs)    Types: Cigarettes    Start date:     Quit date:     Years since quittin.3   Smokeless Tobacco Never         Review of Systems:        Objective        Vitals:    24 1044   BP: 109/74   BP Location: Left arm   Patient Position: Sitting   BP Cuff Size: Large adult   Pulse: 70   Temp: 36.4 °C (97.6 °F)   TempSrc: Temporal   SpO2: 96%   Weight: 92.5 kg (204 lb)       Pt is A and O x3, NAD, nontoxic, well-hydrated   Head- normocephalic and atraumatic,   EYES- conjunctiva- normal   lids- normal  EARS/NOSE- normal external exam   CV- RRR without murmur  PULM- CTA bilaterally, normal respiratory effort  RESPIRATORY EFFORT- normal , no retractions or nasal flaring   ABD- normoactive BS's , soft, no HSM, no CVAT, NT   EXT- no edema,NT  SKIN- no abnormal skin lesions noted  NEURO- no focal deficits  PSYCH- pleasant, normal judgement and insight                    BP Readings from Last 3 Encounters:    05/07/24 109/74   04/17/24 99/66   01/17/24 112/74           Wt Readings from Last 3 Encounters:   05/07/24 92.5 kg (204 lb)   04/17/24 92.5 kg (204 lb)   03/25/24 90.7 kg (200 lb)         BMI Readings from Last 3 Encounters:   05/07/24 33.95 kg/m²   04/17/24 33.95 kg/m²   03/25/24 33.28 kg/m²       The number and complexity of problems addressed is considered moderate.  The amount and/or complexity of data reviewed and analyzed is considered moderate. The risk of complications and/or morbidity/mortality of patient is considered moderate. Overall, this patient encounter is considered a moderate risk visit.      What are antibiotics?  Antibiotics are medicines that help people fight infections caused by bacteria. They work by killing bacteria that are in the body. These medicines come in many different forms, including pills, ointments, and liquids that are given by injection.    Antibiotics can do a lot of good. For people with serious bacterial infections, antibiotics can save lives. But people use them far too often, even when they're not needed. This is causing a very serious problem called antibiotic resistance. Antibiotic resistance happens when bacteria that have been exposed to an antibiotic change so that the antibiotic can no longer kill them. In those bacteria, the antibiotic has no effect. Because of this problem, doctors are having a harder and harder time treating infections. Experts worry that there will soon be infections that don't respond to any antibiotics.    When are antibiotics helpful?  Antibiotics can help people fight off infections caused by bacteria. They do not work on infections caused by viruses.    Some common bacterial infections that are treated with antibiotics include:    Strep throat    Pneumonia (an infection of the lungs)    Bladder infections    Infections you catch through sex, such as gonorrhea and chlamydia    When are antibiotics NOT helpful?    Antibiotics do not work on  infections caused by viruses.    Antibiotics are not helpful for the common cold, because the common cold is caused by a virus.    Antibiotics are not helpful for the flu, because the flu is caused by a virus. (People with the flu can be treated with medicines called antiviral medicines, which are different from antibiotics.)      Even though antibiotics don't work on infections caused by viruses, people sometimes believe that they do. That's because they took antibiotics for a viral infection before and then got better. The problem is that those people would have gotten better with or without an antibiotic. When they get better with the antibiotic, they think that's what cured them, when in reality the antibiotic had nothing to do with it.    What's the harm in taking antibiotics even if they might not help?  There are many reasons you should not take antibiotics unless you absolutely need them:    Antibiotics cause side effects, such as nausea, vomiting, and diarrhea. They can even make it more likely that you will get a different kind of infection, such as yeast infection (if you are a woman).    Allergies to antibiotics are common. You can develop an allergy to an antibiotic, even if you have not had a problem with it before. Some allergies are just unpleasant, causing rashes and itching. But some can be very serious and even life-threatening. It is better to avoid any risk of an allergy, if the antibiotic wouldn't help you anyway.    Overuse of antibiotics leads to antibiotic resistance. Using antibiotics when they are not needed gives bacteria a chance to change, so that the antibiotics cannot hurt them later on. People who have infections caused by antibiotic-resistant bacteria often have to be treated in the hospital with many different antibiotics. People can even die from these infections, because there is no antibiotic that will cure them.    When should I take antibiotics?  You should take antibiotics  "only when a doctor or nurse prescribes them to you. You should never take antibiotics prescribed to someone else, and you should not take antibiotics that were prescribed to you for a previous illness. When prescribing an antibiotic, doctors and nurses have to carefully pick the right antibiotic for a particular infection. Not all antibiotics work on all bacteria.    If you do have an infection caused by a bacteria, your doctor or nurse might want to find out what that bacteria is, and which antibiotics can kill it. They do this by taking a \"culture\" of the bacteria and growing it in the lab. But it's not possible to do a culture on someone who has already started an antibiotic. So if you start an antibiotic without input from a doctor or nurse it will be impossible to know if you have taken the right one.    What can I do to reduce antibiotic resistance?  Here are some things that can help:    Do not pressure your doctor or nurse for antibiotics when they do not think you need them.    If you are prescribed antibiotics, finish all of the medicine and take it exactly as directed. Never skip doses or stop taking the medicine without talking to your doctor or nurse.    Do not give antibiotics that were prescribed to you to anyone else.    What if my doctor prescribes an antibiotic that did not work for me before?  If an antibiotic did not work for you before, that does not mean it will never work for you. If you have used an antibiotic before and it did not work, tell your doctor. But keep in mind that the infection you had before might not have been caused by the same bacteria that you have now. The \"best\" antibiotic is the right one for the bacteria causing the infection, not for the person with the infection.    What if I am allergic to an antibiotic?  If you had a bad reaction to an antibiotic, tell your doctor or nurse about it. But do not assume you are allergic unless your doctor or nurse tells you that you " are.    Many people who think they are allergic to an antibiotic are wrong. If you get nauseous after taking an antibiotic, that does not mean you are allergic to it. Nausea is a common side effect of many antibiotics. If you are a woman and you get a yeast infection after taking an antibiotic, that does not mean you are allergic to it. Yeast infections are a common side effect of antibiotics.    Symptoms of antibiotic allergy can be mild and include a flat rash and itching. They can also be more serious and include:    Hives - Hives are raised, red patches of skin that are usually very itchy (picture 1).    Lip or tongue swelling    Trouble swallowing or breathing    Serious allergy symptoms can start right after you start taking an antibiotic if you are very sensitive. Less serious symptoms, on the other hand, often start a day or more later.    Almost all antibiotics may cause possible side effects of allergic reaction, nausea, vomiting, diarrhea- most worrisome caused by C. diff, and secondary yeast infection.    Assessment/Plan          1. Dysuria  nitrofurantoin, macrocrystal-monohydrate, (Macrobid) 100 mg capsule    Urinalysis with Reflex Microscopic    Urine Culture      2. Urinary frequency  nitrofurantoin, macrocrystal-monohydrate, (Macrobid) 100 mg capsule          Orders Placed This Encounter   Procedures    Urine Culture    Urinalysis with Reflex Microscopic         Start macrobid        Urine ordered       Increase fluids         Call if no better or if symptoms worsen

## 2024-05-10 LAB — BACTERIA UR CULT: ABNORMAL

## 2024-07-15 NOTE — PROGRESS NOTES
Subjective        Delaney Kaplan. Is 63 y.o.. female. Here for follow up office visit-       Chief Complaint   Patient presents with    Follow-up     6 mo fuv       HPI:        Follow up for DM      Pt has declined starting Medication - metformin and has been doing very well with diet and exercise- due to recehck Hgba1c      Hgba1c- decreased from 11.0 to 8.8 with her lifestyle changes       Pt is doing well      Due for lab       Down 28 pounds since last October       REVIEW OF SYSTEMS:        Objective      Vitals:    07/17/24 0640   BP: 117/75   BP Location: Left arm   Patient Position: Sitting   BP Cuff Size: Adult   Pulse: 64   Resp: 12   Temp: 36.6 °C (97.9 °F)   SpO2: 96%   Weight: 91.9 kg (202 lb 9.6 oz)                       PHYSICAL:      Patient is alert and oriented x 3 , NAD  HEAD- normocephalic and atraumatic   EYES-conjunctiva-normal, lids - normal  EARS/NOSE- normal external exam   NECK-supple,FROM  CV- RRR without murmur  PULM- CTA bilaterally, normal respiratory effort  RESPIRATORY EFFORT- normal , no retractions or nasal flaring   ABD- normoactive BS's   EXT- no edema,NT  SKIN- no abnormal skin lesions noted  NEURO- no focal deficits  PSYCH- pleasant, normal judgement and insight      BP Readings from Last 3 Encounters:   07/17/24 117/75   05/07/24 109/74   04/17/24 99/66             Wt Readings from Last 3 Encounters:   07/17/24 91.9 kg (202 lb 9.6 oz)   05/07/24 92.5 kg (204 lb)   04/17/24 92.5 kg (204 lb)           BMI Readings from Last 3 Encounters:   07/17/24 33.71 kg/m²   05/07/24 33.95 kg/m²   04/17/24 33.95 kg/m²               The number and complexity of problems addressed is considered moderate.  The amount and/or complexity of data reviewed and analyzed is considered moderate. The risk of complications and/or morbidity/mortality of patient is considered moderate. Overall, this patient encounter is considered a moderate risk visit.    Call pt -cholesterol is elevated- add oatmeal,  blueberries, cheerios to your diet, use olive oil when cooking, you should have 7 g soluble fiber daily in your diet, add omega 3 fish oils  and increase exercise     High cholesterol- increases your risk for cardiovascular disease.(Heart attack/stroke).  It is recommended to limit refined carbohydrates such as breads, cereals, alcohol, pasta, pretzels, and sugar- sweetened foods and beverages.  Eat lots of vegetables, lean proteins such as eggs, fish, chicken, and fruit.   Eat small amounts of healthy fat each day -  a handful of walnuts, an avocado, olive oil on your salad, and fatty fishes such as salmon. Exercise should be included as you are able, with a goal of 30 minutes 4-6 days per week    For patients that are already taking medications for their cholesterol:  Continue any prescribed and advised OTC medications    recheck in 6-12 months        Assessment/Plan      Assessment & Plan  Type 2 diabetes mellitus without complication, without long-term current use of insulin (Multi)  Recheck Hgba1c today  No Medication at this time   Orders:    Hemoglobin A1C; Future    Primary hypertension  On lisinopril                  Continue medication      Ordered lab      Follow up in 6 months

## 2024-07-17 ENCOUNTER — APPOINTMENT (OUTPATIENT)
Dept: PRIMARY CARE | Facility: CLINIC | Age: 64
End: 2024-07-17
Payer: COMMERCIAL

## 2024-07-17 VITALS
HEART RATE: 64 BPM | TEMPERATURE: 97.9 F | SYSTOLIC BLOOD PRESSURE: 117 MMHG | DIASTOLIC BLOOD PRESSURE: 75 MMHG | RESPIRATION RATE: 12 BRPM | OXYGEN SATURATION: 96 % | WEIGHT: 202.6 LBS | BODY MASS INDEX: 33.71 KG/M2

## 2024-07-17 DIAGNOSIS — I10 PRIMARY HYPERTENSION: ICD-10-CM

## 2024-07-17 DIAGNOSIS — E11.9 TYPE 2 DIABETES MELLITUS WITHOUT COMPLICATION, WITHOUT LONG-TERM CURRENT USE OF INSULIN (MULTI): Primary | Chronic | ICD-10-CM

## 2024-07-17 PROCEDURE — 36415 COLL VENOUS BLD VENIPUNCTURE: CPT

## 2024-07-17 PROCEDURE — 3074F SYST BP LT 130 MM HG: CPT | Performed by: FAMILY MEDICINE

## 2024-07-17 PROCEDURE — 99214 OFFICE O/P EST MOD 30 MIN: CPT | Performed by: FAMILY MEDICINE

## 2024-07-17 PROCEDURE — 1036F TOBACCO NON-USER: CPT | Performed by: FAMILY MEDICINE

## 2024-07-17 PROCEDURE — 83036 HEMOGLOBIN GLYCOSYLATED A1C: CPT

## 2024-07-17 PROCEDURE — 3078F DIAST BP <80 MM HG: CPT | Performed by: FAMILY MEDICINE

## 2024-07-17 PROCEDURE — 3052F HG A1C>EQUAL 8.0%<EQUAL 9.0%: CPT | Performed by: FAMILY MEDICINE

## 2024-07-17 PROCEDURE — 4010F ACE/ARB THERAPY RXD/TAKEN: CPT | Performed by: FAMILY MEDICINE

## 2024-07-18 LAB
EST. AVERAGE GLUCOSE BLD GHB EST-MCNC: 180 MG/DL
HBA1C MFR BLD: 7.9 %

## 2024-11-14 ENCOUNTER — OFFICE VISIT (OUTPATIENT)
Dept: PRIMARY CARE | Facility: CLINIC | Age: 64
End: 2024-11-14
Payer: COMMERCIAL

## 2024-11-14 VITALS
RESPIRATION RATE: 14 BRPM | SYSTOLIC BLOOD PRESSURE: 113 MMHG | DIASTOLIC BLOOD PRESSURE: 77 MMHG | TEMPERATURE: 98.2 F | OXYGEN SATURATION: 96 % | HEART RATE: 70 BPM

## 2024-11-14 DIAGNOSIS — R09.81 NASAL CONGESTION: ICD-10-CM

## 2024-11-14 DIAGNOSIS — J06.9 VIRAL URI WITH COUGH: Primary | ICD-10-CM

## 2024-11-14 PROCEDURE — 4010F ACE/ARB THERAPY RXD/TAKEN: CPT | Performed by: FAMILY MEDICINE

## 2024-11-14 PROCEDURE — 3074F SYST BP LT 130 MM HG: CPT | Performed by: FAMILY MEDICINE

## 2024-11-14 PROCEDURE — 3078F DIAST BP <80 MM HG: CPT | Performed by: FAMILY MEDICINE

## 2024-11-14 PROCEDURE — 1036F TOBACCO NON-USER: CPT | Performed by: FAMILY MEDICINE

## 2024-11-14 PROCEDURE — 99213 OFFICE O/P EST LOW 20 MIN: CPT | Performed by: FAMILY MEDICINE

## 2024-11-14 PROCEDURE — 3051F HG A1C>EQUAL 7.0%<8.0%: CPT | Performed by: FAMILY MEDICINE

## 2024-11-14 RX ORDER — IPRATROPIUM BROMIDE 21 UG/1
2 SPRAY, METERED NASAL EVERY 12 HOURS
Qty: 30 ML | Refills: 0 | Status: SHIPPED | OUTPATIENT
Start: 2024-11-14 | End: 2024-11-21

## 2024-11-14 ASSESSMENT — ENCOUNTER SYMPTOMS
SINUS PAIN: 0
SHORTNESS OF BREATH: 0
SINUS PRESSURE: 0
SORE THROAT: 0
NAUSEA: 0
COUGH: 1
DIARRHEA: 0

## 2024-11-14 NOTE — PROGRESS NOTES
Subjective   Patient ID: Delaney Kaplan is a 64 y.o. female who presents for URI symptoms.     HPI   X7 days  C/o postnasal drip and cough (productive)  Reports temp initially x 5 days, but did not take temp at home  No shortness of breath  No n/v/d  +sick contacts  No covid testing at home  Taking nothing for symptoms   No headache, sinus pressure     Review of Systems   HENT:  Positive for congestion and postnasal drip. Negative for ear pain, sinus pressure, sinus pain and sore throat.    Respiratory:  Positive for cough. Negative for shortness of breath.    Gastrointestinal:  Negative for diarrhea and nausea.   All other systems reviewed and are negative.    Objective   /77 (BP Location: Left arm, Patient Position: Sitting, BP Cuff Size: Large adult)   Pulse 70   Temp 36.8 °C (98.2 °F) (Temporal)   Resp 14   SpO2 96%     Physical Exam  Constitutional: Well developed, well nourished, alert and in no acute distress.  Head and Face: NC/AT  Eyes: Normal external exam.   ENT: External inspection of ears normal, tympanic membranes visualized and normal. Nasal mucosa and turbinates swollen and erythematous, no nasal discharge present. Oral mucosa moist, oropharynx clear without tonsillar exudate or erythema.   Neck: Supple. No cervical lymphadenopathy   Cardiovascular: Regular rate and rhythm, normal S1 and S2, no murmurs, gallops, or rubs.   Pulmonary: No respiratory distress, lungs clear to auscultation bilaterally. No wheezes, rhonchi, rales.  Skin: Warm, well perfused, normal skin turgor and color.   Neurologic: Cranial nerves II-XII grossly intact.    Assessment/Plan   UPPER RESPIRATORY INFECTION PLAN:  START atrovent nasal spray as directed x 7 days. If it is too drying, reduce to 1 spray in each nostril 1-2 days.     You have been diagnosed with an upper respiratory tract infection (URI), which is an inflammation/infection of the upper respiratory tract /lungs.  These are almost viral in nature, and  therefore treatment is management of symptoms. Treatment with an antibiotic for typical URI does not help, and can cause harm from side effects of medications and bacterial resistance.     Drink at least 6-8 glasses of water daily to thin secretions.  Mucinex can be used if secretions remain thick.      A teaspoon of honey every 4 hours as needed for cough has been shown to reduce cough as well or better than over-the-counter cough suppressants.  Delsym or Robitussin are recommended to stop cough.  Cough drops can also be helpful.     For nasal congestion, please use [facility] Med Sinus Rinse at least once daily to rinse out your sinuses. You can also try Flonase nasal spray over the counter - 1-2 sprays in each nostril daily. You can also consider Sudafed or Phenylephrine for nasal congestion but avoid if have hypertension and be aware can stimulate and cause problems sleeping.  Do not use for more than 5 days. Afrin decongestant nasal spray (oxymetazoline) can also be used for 2-3 days only.     For drainage problems, try Allegra, Claritin or Zyrtec.      For sore throat, try honey in tea, Chloraseptic, Cepacol throat lozenges, and salt water gargles.      Fever or aches can be helped by taking acetaminophen (Tylenol) every four hours as needed, or ibuprofen (Motrin, Advil) or naproxen (Aleve) as directed if you are able.   Maximum dosing of ibuprofen is 800 mg every 8 hours and maximum dose of tylenol is 1,000 mg every 8 hours - do not use for longer than 1 week unless directed by your doctor.       If you should develop a fever and worsening cough or nasal secretions with consistent yellow or green phlegm, please contact us.

## 2025-01-07 NOTE — PROGRESS NOTES
Subjective      HPI:    Delaney Kaplan. Is 64 y.o.. female. Here for acute visit-             Chief Complaint   Patient presents with    Cough     X 1 week          What concern/ problem/pain/symptom  brings you here today? Cough       how long has pt had sxs? 1 week       describe symptoms-    fever- n   nausea- n   vomiting-n       Are symptoms all day ? Y   Do symptoms occur at night? Y       has pt tried anything for current symptoms, including medications (OTC or prescription)  ?  Nasal spray       what makes symptoms worse?  Laying down, talking       Does anything make symptoms better?  N       has pt been seen recently for this problem ( within past 2-3 weeks) ?  N     if yes- where? N     by who?    what treatment was provided?      Social History     Tobacco Use   Smoking Status Former    Current packs/day: 0.00    Average packs/day: 1.5 packs/day for 6.0 years (9.0 ttl pk-yrs)    Types: Cigarettes    Start date:     Quit date:     Years since quittin.0    Passive exposure: Past   Smokeless Tobacco Never       Social History     Substance and Sexual Activity   Alcohol Use Not Currently    Comment: rare          Review of Systems       Objective            Vitals:    25 1044   BP: 118/76   BP Location: Left arm   Patient Position: Sitting   BP Cuff Size: Large adult   Pulse: 84   Resp: 16   Temp: 36.3 °C (97.4 °F)   TempSrc: Temporal   SpO2: 97%   Weight: 93.6 kg (206 lb 6.4 oz)           PHYSICAL:      Pt is A and O x3, NAD, Vital signs are within normal limits  General Appearance- normal , good hygiene,talks easily  EYES- conjunctiva and lids- normal  EARS/NOSE-TM's normal, head normocephalic and atraumatic, nasopharynx-green nasal discharge, no trismus, no hot potato voice  OROPHARYNX- no exudate  NECK- supple, FROM  LYMPH- no cervical lymph nodes palpated   CV- RRR without murmur  PULM- CTA bilaterally  Respiratory effort- normal respiratory effort , no retractions or nasal flaring    ABDOMEN- normoactive BS's   EXTREMITIES-no edema,NT  SKIN- no abnormal skin lesions on inspection or palpation   NEURO- no focal deficits  PSYCH- pleasant, normal judgement and insight      BP Readings from Last 3 Encounters:   01/09/25 118/76   11/14/24 113/77   07/17/24 117/75         Wt Readings from Last 3 Encounters:   01/09/25 93.6 kg (206 lb 6.4 oz)   07/17/24 91.9 kg (202 lb 9.6 oz)   05/07/24 92.5 kg (204 lb)       BMI Readings from Last 3 Encounters:   01/09/25 34.35 kg/m²   07/17/24 33.71 kg/m²   05/07/24 33.95 kg/m²           The number and complexity of problems addressed is considered moderate.  The amount and/or complexity of data reviewed and analyzed is considered moderate. The risk of complications and/or morbidity/mortality of patient is considered moderate. Overall, this patient encounter is considered a moderate risk visit.      What are antibiotics?  Antibiotics are medicines that help people fight infections caused by bacteria. They work by killing bacteria that are in the body. These medicines come in many different forms, including pills, ointments, and liquids that are given by injection.    Antibiotics can do a lot of good. For people with serious bacterial infections, antibiotics can save lives. But people use them far too often, even when they're not needed. This is causing a very serious problem called antibiotic resistance. Antibiotic resistance happens when bacteria that have been exposed to an antibiotic change so that the antibiotic can no longer kill them. In those bacteria, the antibiotic has no effect. Because of this problem, doctors are having a harder and harder time treating infections. Experts worry that there will soon be infections that don't respond to any antibiotics.    When are antibiotics helpful?  Antibiotics can help people fight off infections caused by bacteria. They do not work on infections caused by viruses.    Some common bacterial infections that are  treated with antibiotics include:    Strep throat    Pneumonia (an infection of the lungs)    Bladder infections    Infections you catch through sex, such as gonorrhea and chlamydia    When are antibiotics NOT helpful?    Antibiotics do not work on infections caused by viruses.    Antibiotics are not helpful for the common cold, because the common cold is caused by a virus.    Antibiotics are not helpful for the flu, because the flu is caused by a virus. (People with the flu can be treated with medicines called antiviral medicines, which are different from antibiotics.)      Even though antibiotics don't work on infections caused by viruses, people sometimes believe that they do. That's because they took antibiotics for a viral infection before and then got better. The problem is that those people would have gotten better with or without an antibiotic. When they get better with the antibiotic, they think that's what cured them, when in reality the antibiotic had nothing to do with it.    What's the harm in taking antibiotics even if they might not help?  There are many reasons you should not take antibiotics unless you absolutely need them:    Antibiotics cause side effects, such as nausea, vomiting, and diarrhea. They can even make it more likely that you will get a different kind of infection, such as yeast infection (if you are a woman).    Allergies to antibiotics are common. You can develop an allergy to an antibiotic, even if you have not had a problem with it before. Some allergies are just unpleasant, causing rashes and itching. But some can be very serious and even life-threatening. It is better to avoid any risk of an allergy, if the antibiotic wouldn't help you anyway.    Overuse of antibiotics leads to antibiotic resistance. Using antibiotics when they are not needed gives bacteria a chance to change, so that the antibiotics cannot hurt them later on. People who have infections caused by  "antibiotic-resistant bacteria often have to be treated in the hospital with many different antibiotics. People can even die from these infections, because there is no antibiotic that will cure them.    When should I take antibiotics?  You should take antibiotics only when a doctor or nurse prescribes them to you. You should never take antibiotics prescribed to someone else, and you should not take antibiotics that were prescribed to you for a previous illness. When prescribing an antibiotic, doctors and nurses have to carefully pick the right antibiotic for a particular infection. Not all antibiotics work on all bacteria.    If you do have an infection caused by a bacteria, your doctor or nurse might want to find out what that bacteria is, and which antibiotics can kill it. They do this by taking a \"culture\" of the bacteria and growing it in the lab. But it's not possible to do a culture on someone who has already started an antibiotic. So if you start an antibiotic without input from a doctor or nurse it will be impossible to know if you have taken the right one.    What can I do to reduce antibiotic resistance?  Here are some things that can help:    Do not pressure your doctor or nurse for antibiotics when they do not think you need them.    If you are prescribed antibiotics, finish all of the medicine and take it exactly as directed. Never skip doses or stop taking the medicine without talking to your doctor or nurse.    Do not give antibiotics that were prescribed to you to anyone else.    What if my doctor prescribes an antibiotic that did not work for me before?  If an antibiotic did not work for you before, that does not mean it will never work for you. If you have used an antibiotic before and it did not work, tell your doctor. But keep in mind that the infection you had before might not have been caused by the same bacteria that you have now. The \"best\" antibiotic is the right one for the bacteria causing " the infection, not for the person with the infection.    What if I am allergic to an antibiotic?  If you had a bad reaction to an antibiotic, tell your doctor or nurse about it. But do not assume you are allergic unless your doctor or nurse tells you that you are.    Many people who think they are allergic to an antibiotic are wrong. If you get nauseous after taking an antibiotic, that does not mean you are allergic to it. Nausea is a common side effect of many antibiotics. If you are a woman and you get a yeast infection after taking an antibiotic, that does not mean you are allergic to it. Yeast infections are a common side effect of antibiotics.    Symptoms of antibiotic allergy can be mild and include a flat rash and itching. They can also be more serious and include:    Hives - Hives are raised, red patches of skin that are usually very itchy (picture 1).    Lip or tongue swelling    Trouble swallowing or breathing    Serious allergy symptoms can start right after you start taking an antibiotic if you are very sensitive. Less serious symptoms, on the other hand, often start a day or more later.    Almost all antibiotics may cause possible side effects of allergic reaction, nausea, vomiting, diarrhea- most worrisome caused by C. diff, and secondary yeast infection.        Assessment/Plan        Assessment & Plan  Cough, unspecified type           Rest     fluids            Call if no better or if symptoms worsen        Time spent during the office visit includes-    updating and familiarizing myself with patients past medical history, social history, and allergies :  discussing ROS ;  obtaining direct  chief complaint and history of present illness from patient ,  reviewing and reconciling current medication list - both prescription and over the counter medications    clinically examine patient    determine what testing if any is needed to  diagnose the condition/conditions  with which patient is  presenting    using medical knowledge to put all of the information together and decide on best course of action to proceed with diagnosis  and  treatment for the presenting problem or problems    Pre-visit planning, chart review, and face-to-face encounter   Discussion of medications  Coordination with office staff for med adjustments   Final documentation of visit          My total time spent caring for the patient for the day of the encounter (before,during, and after) was =          total minutes.    (Prep+during visit+post visit)

## 2025-01-09 ENCOUNTER — OFFICE VISIT (OUTPATIENT)
Dept: PRIMARY CARE | Facility: CLINIC | Age: 65
End: 2025-01-09
Payer: COMMERCIAL

## 2025-01-09 VITALS
RESPIRATION RATE: 16 BRPM | OXYGEN SATURATION: 97 % | TEMPERATURE: 97.4 F | HEART RATE: 84 BPM | WEIGHT: 206.4 LBS | BODY MASS INDEX: 34.35 KG/M2 | SYSTOLIC BLOOD PRESSURE: 118 MMHG | DIASTOLIC BLOOD PRESSURE: 76 MMHG

## 2025-01-09 DIAGNOSIS — R05.9 COUGH, UNSPECIFIED TYPE: Primary | Chronic | ICD-10-CM

## 2025-01-09 PROCEDURE — 3078F DIAST BP <80 MM HG: CPT | Performed by: FAMILY MEDICINE

## 2025-01-09 PROCEDURE — 4010F ACE/ARB THERAPY RXD/TAKEN: CPT | Performed by: FAMILY MEDICINE

## 2025-01-09 PROCEDURE — 1036F TOBACCO NON-USER: CPT | Performed by: FAMILY MEDICINE

## 2025-01-09 PROCEDURE — 3074F SYST BP LT 130 MM HG: CPT | Performed by: FAMILY MEDICINE

## 2025-01-09 PROCEDURE — 99214 OFFICE O/P EST MOD 30 MIN: CPT | Performed by: FAMILY MEDICINE

## 2025-01-09 RX ORDER — AMOXICILLIN 500 MG/1
CAPSULE ORAL
Qty: 40 CAPSULE | Refills: 0 | Status: SHIPPED | OUTPATIENT
Start: 2025-01-09

## 2025-01-15 PROBLEM — H69.90 ETD (EUSTACHIAN TUBE DYSFUNCTION): Status: RESOLVED | Noted: 2023-09-08 | Resolved: 2025-01-15

## 2025-01-15 PROBLEM — E11.9 DIABETES (MULTI): Chronic | Status: ACTIVE | Noted: 2023-09-08

## 2025-01-15 PROBLEM — R10.12 ABDOMINAL WALL PAIN IN LEFT UPPER QUADRANT: Status: RESOLVED | Noted: 2017-05-09 | Resolved: 2025-01-15

## 2025-01-15 PROBLEM — R20.0 NUMBNESS AND TINGLING OF LEFT UPPER EXTREMITY: Chronic | Status: RESOLVED | Noted: 2024-01-17 | Resolved: 2025-01-15

## 2025-01-15 PROBLEM — E11.9 DIABETES MELLITUS, NEW ONSET: Status: RESOLVED | Noted: 2023-09-08 | Resolved: 2025-01-15

## 2025-01-15 PROBLEM — G56.22 ULNAR NEUROPATHY AT ELBOW, LEFT: Status: RESOLVED | Noted: 2024-01-26 | Resolved: 2025-01-15

## 2025-01-15 PROBLEM — M25.569 PAIN IN JOINT, LOWER LEG: Status: RESOLVED | Noted: 2023-09-08 | Resolved: 2025-01-15

## 2025-01-15 PROBLEM — R30.0 DYSURIA: Chronic | Status: RESOLVED | Noted: 2024-05-07 | Resolved: 2025-01-15

## 2025-01-15 PROBLEM — R20.2 NUMBNESS AND TINGLING OF LEFT UPPER EXTREMITY: Chronic | Status: RESOLVED | Noted: 2024-01-17 | Resolved: 2025-01-15

## 2025-01-15 PROBLEM — M25.549 PAIN IN THUMB JOINT WITH MOVEMENT: Status: RESOLVED | Noted: 2023-09-08 | Resolved: 2025-01-15

## 2025-01-15 PROBLEM — E11.9 TYPE 2 DIABETES, HBA1C GOAL < 7% (MULTI): Chronic | Status: ACTIVE | Noted: 2025-01-15

## 2025-01-15 PROBLEM — R03.0 ELEVATED BP WITHOUT DIAGNOSIS OF HYPERTENSION: Chronic | Status: RESOLVED | Noted: 2024-01-17 | Resolved: 2025-01-15

## 2025-01-15 PROBLEM — R35.0 URINARY FREQUENCY: Chronic | Status: RESOLVED | Noted: 2024-05-07 | Resolved: 2025-01-15

## 2025-01-15 NOTE — ASSESSMENT & PLAN NOTE
Taking supplements   Orders:    Vitamin D 25-Hydroxy,Total (for eval of Vitamin D levels); Future

## 2025-01-15 NOTE — PROGRESS NOTES
Subjective        Delaney Kaplan. Is 64 y.o.. female. Here for follow up office visit-       Chief Complaint   Patient presents with    6 MONTH F/U        HPI:        Follow up for DM- recheck Hgba1c- never started metformin  On diet and exercise  only- has improved 11.1 to 7.9    Plan restart metformin if Hgba1c >7.0 - discussed side effects - pt decided not to start at this time unless has increased                 Pt is doing well      Due for lab       Other medical specialists are involved in patient's care.    Any recent notes that were available were reviewed.    All conditions are monitored, evaluated and assessed regularly.    Patient is compliant with current treatment regimen/medications.    Specialists involved include:      Dr Tripp - GYN       1/9/25 - seen for cough- Rx amoxicillin - much better       REVIEW OF SYSTEMS:        Objective      Vitals:    01/17/25 0656   BP: 126/78   BP Location: Left arm   Patient Position: Sitting   BP Cuff Size: Large adult   Pulse: 70   Resp: 14   Temp: 36.3 °C (97.3 °F)   TempSrc: Temporal   SpO2: 96%   Weight: 93.5 kg (206 lb 1.6 oz)                         PHYSICAL:      Patient is alert and oriented x 3 , NAD  HEAD- normocephalic and atraumatic   EYES-conjunctiva-normal, lids - normal  EARS/NOSE- normal external exam   NECK-supple,FROM  CV- RRR without murmur  PULM- CTA bilaterally, normal respiratory effort  RESPIRATORY EFFORT- normal , no retractions or nasal flaring   ABD- normoactive BS's   EXT- no edema,NT  SKIN- no abnormal skin lesions noted  NEURO- no focal deficits  PSYCH- pleasant, normal judgement and insight      BP Readings from Last 3 Encounters:   01/17/25 126/78   01/09/25 118/76   11/14/24 113/77             Wt Readings from Last 3 Encounters:   01/17/25 93.5 kg (206 lb 1.6 oz)   01/09/25 93.6 kg (206 lb 6.4 oz)   07/17/24 91.9 kg (202 lb 9.6 oz)           BMI Readings from Last 3 Encounters:   01/17/25 34.30 kg/m²   01/09/25 34.35 kg/m²    07/17/24 33.71 kg/m²               The number and complexity of problems addressed is considered moderate.  The amount and/or complexity of data reviewed and analyzed is considered moderate. The risk of complications and/or morbidity/mortality of patient is considered moderate. Overall, this patient encounter is considered a moderate risk visit.    Call pt -cholesterol is elevated- add oatmeal, blueberries, cheerios to your diet, use olive oil when cooking, you should have 7 g soluble fiber daily in your diet, add omega 3 fish oils  and increase exercise     High cholesterol- increases your risk for cardiovascular disease.(Heart attack/stroke).  It is recommended to limit refined carbohydrates such as breads, cereals, alcohol, pasta, pretzels, and sugar- sweetened foods and beverages.  Eat lots of vegetables, lean proteins such as eggs, fish, chicken, and fruit.   Eat small amounts of healthy fat each day -  a handful of walnuts, an avocado, olive oil on your salad, and fatty fishes such as salmon. Exercise should be included as you are able, with a goal of 30 minutes 4-6 days per week        Assessment/Plan      Assessment & Plan  Vitamin D deficiency  Taking supplements   Orders:    Vitamin D 25-Hydroxy,Total (for eval of Vitamin D levels); Future    Hypercholesterolemia  Fish oil   Orders:    Hepatic Function Panel; Future    Lipid Panel; Future    Hx of migraines  Stable , mild , imtermittent        Type 2 diabetes mellitus without complication, without long-term current use of insulin (Multi)  Diet and exercise   Lisinopril   Orders:    Hemoglobin A1C; Future    Albumin-Creatinine Ratio, Urine Random    Class 1 obesity due to excess calories with body mass index (BMI) of 34.0 to 34.9 in adult, unspecified whether serious comorbidity present  Patient's BMI is elevated.  Plan- diet and exercise- BMI is elevated. Need to increase activity on a daily basis especially walking.  Monitor  total calories per day-  decrease carbohydrates and fats. Goal - lose 1-2 pounds per week.    Recommend 150 minutes of moderate-intensity exercise as tolerated per week and 2-3 days of resistance, flexibility, and neuromotor exercises per week.    Normal BMI- 18.5-25    Overweight=  BMI 26-29    Obese= BMI 30-39    Morbidly Obese = BMI >40           Need for shingles vaccine    Orders:    Zoster vaccine, recombinant, adult (SHINGRIX)              Continue medication      Ordered lab      Follow up in 3 months

## 2025-01-15 NOTE — ASSESSMENT & PLAN NOTE
Diet and exercise   Lisinopril   Orders:    Hemoglobin A1C; Future    Albumin-Creatinine Ratio, Urine Random

## 2025-01-17 ENCOUNTER — LAB (OUTPATIENT)
Dept: LAB | Facility: LAB | Age: 65
End: 2025-01-17
Payer: COMMERCIAL

## 2025-01-17 ENCOUNTER — APPOINTMENT (OUTPATIENT)
Dept: PRIMARY CARE | Facility: CLINIC | Age: 65
End: 2025-01-17
Payer: COMMERCIAL

## 2025-01-17 VITALS
OXYGEN SATURATION: 96 % | HEART RATE: 70 BPM | DIASTOLIC BLOOD PRESSURE: 78 MMHG | TEMPERATURE: 97.3 F | BODY MASS INDEX: 34.3 KG/M2 | WEIGHT: 206.1 LBS | RESPIRATION RATE: 14 BRPM | SYSTOLIC BLOOD PRESSURE: 126 MMHG

## 2025-01-17 DIAGNOSIS — E78.00 HYPERCHOLESTEROLEMIA: Chronic | ICD-10-CM

## 2025-01-17 DIAGNOSIS — E11.9 TYPE 2 DIABETES MELLITUS WITHOUT COMPLICATION, WITHOUT LONG-TERM CURRENT USE OF INSULIN (MULTI): Chronic | ICD-10-CM

## 2025-01-17 DIAGNOSIS — E66.811 CLASS 1 OBESITY DUE TO EXCESS CALORIES WITH BODY MASS INDEX (BMI) OF 34.0 TO 34.9 IN ADULT, UNSPECIFIED WHETHER SERIOUS COMORBIDITY PRESENT: Chronic | ICD-10-CM

## 2025-01-17 DIAGNOSIS — Z23 NEED FOR SHINGLES VACCINE: ICD-10-CM

## 2025-01-17 DIAGNOSIS — E55.9 VITAMIN D DEFICIENCY: Chronic | ICD-10-CM

## 2025-01-17 DIAGNOSIS — Z86.69 HX OF MIGRAINES: Chronic | ICD-10-CM

## 2025-01-17 DIAGNOSIS — E55.9 VITAMIN D DEFICIENCY: Primary | Chronic | ICD-10-CM

## 2025-01-17 DIAGNOSIS — E66.09 CLASS 1 OBESITY DUE TO EXCESS CALORIES WITH BODY MASS INDEX (BMI) OF 34.0 TO 34.9 IN ADULT, UNSPECIFIED WHETHER SERIOUS COMORBIDITY PRESENT: Chronic | ICD-10-CM

## 2025-01-17 PROCEDURE — 3078F DIAST BP <80 MM HG: CPT | Performed by: FAMILY MEDICINE

## 2025-01-17 PROCEDURE — 4010F ACE/ARB THERAPY RXD/TAKEN: CPT | Performed by: FAMILY MEDICINE

## 2025-01-17 PROCEDURE — 1036F TOBACCO NON-USER: CPT | Performed by: FAMILY MEDICINE

## 2025-01-17 PROCEDURE — 90471 IMMUNIZATION ADMIN: CPT | Performed by: FAMILY MEDICINE

## 2025-01-17 PROCEDURE — 82570 ASSAY OF URINE CREATININE: CPT

## 2025-01-17 PROCEDURE — 80061 LIPID PANEL: CPT

## 2025-01-17 PROCEDURE — 99214 OFFICE O/P EST MOD 30 MIN: CPT | Performed by: FAMILY MEDICINE

## 2025-01-17 PROCEDURE — 3074F SYST BP LT 130 MM HG: CPT | Performed by: FAMILY MEDICINE

## 2025-01-17 PROCEDURE — 90750 HZV VACC RECOMBINANT IM: CPT | Performed by: FAMILY MEDICINE

## 2025-01-17 PROCEDURE — 82043 UR ALBUMIN QUANTITATIVE: CPT

## 2025-01-17 PROCEDURE — 80076 HEPATIC FUNCTION PANEL: CPT

## 2025-01-17 PROCEDURE — 83036 HEMOGLOBIN GLYCOSYLATED A1C: CPT

## 2025-01-17 PROCEDURE — 82306 VITAMIN D 25 HYDROXY: CPT

## 2025-01-17 ASSESSMENT — PATIENT HEALTH QUESTIONNAIRE - PHQ9
SUM OF ALL RESPONSES TO PHQ9 QUESTIONS 1 AND 2: 0
2. FEELING DOWN, DEPRESSED OR HOPELESS: NOT AT ALL
1. LITTLE INTEREST OR PLEASURE IN DOING THINGS: NOT AT ALL

## 2025-01-18 LAB
25(OH)D3 SERPL-MCNC: 37 NG/ML (ref 30–100)
ALBUMIN SERPL BCP-MCNC: 4.7 G/DL (ref 3.4–5)
ALP SERPL-CCNC: 59 U/L (ref 33–136)
ALT SERPL W P-5'-P-CCNC: 41 U/L (ref 7–45)
AST SERPL W P-5'-P-CCNC: 23 U/L (ref 9–39)
BILIRUB DIRECT SERPL-MCNC: 0.1 MG/DL (ref 0–0.3)
BILIRUB SERPL-MCNC: 0.7 MG/DL (ref 0–1.2)
CHOLEST SERPL-MCNC: 211 MG/DL (ref 0–199)
CHOLESTEROL/HDL RATIO: 3.8
CREAT UR-MCNC: 112.7 MG/DL (ref 20–320)
EST. AVERAGE GLUCOSE BLD GHB EST-MCNC: 183 MG/DL
HBA1C MFR BLD: 8 %
HDLC SERPL-MCNC: 56.2 MG/DL
LDLC SERPL CALC-MCNC: 125 MG/DL
MICROALBUMIN UR-MCNC: 13.2 MG/L
MICROALBUMIN/CREAT UR: 11.7 UG/MG CREAT
NON HDL CHOLESTEROL: 155 MG/DL (ref 0–149)
PROT SERPL-MCNC: 7 G/DL (ref 6.4–8.2)
TRIGL SERPL-MCNC: 150 MG/DL (ref 0–149)
VLDL: 30 MG/DL (ref 0–40)

## 2025-01-20 ENCOUNTER — TELEPHONE (OUTPATIENT)
Dept: PRIMARY CARE | Facility: CLINIC | Age: 65
End: 2025-01-20

## 2025-01-20 DIAGNOSIS — E11.9 TYPE 2 DIABETES MELLITUS WITHOUT COMPLICATION, WITHOUT LONG-TERM CURRENT USE OF INSULIN (MULTI): Primary | ICD-10-CM

## 2025-01-20 DIAGNOSIS — I10 PRIMARY HYPERTENSION: Chronic | ICD-10-CM

## 2025-01-20 PROBLEM — Z53.20 REFUSAL OF STATIN MEDICATION BY PATIENT: Chronic | Status: ACTIVE | Noted: 2025-01-20

## 2025-01-20 RX ORDER — METFORMIN HYDROCHLORIDE 500 MG/1
500 TABLET, EXTENDED RELEASE ORAL
Qty: 30 TABLET | Refills: 11 | Status: SHIPPED | OUTPATIENT
Start: 2025-01-20 | End: 2026-02-24

## 2025-01-20 NOTE — TELEPHONE ENCOUNTER
Pt called in stating she's willing to start on metformin, but also stated she's not willing to start a Statin. She requested a refill on her lisinopril 2.5 mg sent to pharmacy

## 2025-01-21 RX ORDER — LISINOPRIL 2.5 MG/1
2.5 TABLET ORAL DAILY
Qty: 90 TABLET | Refills: 1 | Status: SHIPPED | OUTPATIENT
Start: 2025-01-21

## 2025-01-30 NOTE — TELEPHONE ENCOUNTER
"Pediatric Therapy at [facility]  Occupational Therapy Treatment Note    Patient: Wes Nolan Today's Date: 25   MRN: [id number] Time:            : 2015 Therapist: SHERYL Butler   Age: 9 y.o. Referring Provider: Ny Odom MD     Diagnosis:  Encounter Diagnosis     ICD-10-CM    1. Development delay  R62.50           SUBJECTIVE  Wes Nolan arrived to therapy session with Mother who reported the following medical/social updates: no new medical concerns at this time, mom did report Wes had some behavior issues on the bus today.    Others present in the treatment area include: not applicable.    Patient Observations:  Required frequent redirection back to tasks and Signs of dysregulation observed: increase sensory seeking movement, impulsivity in a large sensory gym requiring redirection  Impressions based on observation and/or parent report       Authorization Tracking  Visit:   Insurance: [employer]  No Shows: 1  Initial Evaluation: Re eval: 10/2024  Plan of Care Due: 2025    Goals:   Short Term Goals:   Goal Goal Status   Wes will trace a variety of simple shapes within 1/2\" of lines with minimal (1-2) prompts across 3 consecutive sessions.  [] New goal         [] Goal in progress   [x] Goal met         [] Goal modified  [] Goal targeted  [] Goal not targeted   Comments:    Wes will attend to an adult-directed table task for 4-5 minutes with 2 or fewer redirections in 75% of opportunities. [] New goal         [x] Goal in progress   [] Goal met         [] Goal modified  [x] Goal targeted  [] Goal not targeted   Comments:    Wes will transition between therapist directed activities with no more than Min A and without the presence of a behavioral over reaction in 75% of given opportunities. [] New goal         [x] Goal in progress   [] Goal met         [] Goal modified  [x] Goal targeted  [] Goal not targeted   Comments:     Wes will safely complete a 3 " Please call [phone] at your earliest convenience to discuss details for your upcoming surgery with Dr. Chance Bray.  I can be reached during normal business hours, Monday through Friday.    Thanks,  Rose Rubin MBA, BSN, RN-BC  Orthopedic   [facility]  [phone]     "minute motor activity in a large environment (e.g. open gym) without eloping with moderate (3-4) prompts/redirections in order to promote safety and body awareness necessary for participating in school and community environments.  [] New goal         [x] Goal in progress   [] Goal met         [] Goal modified  [x] Goal targeted  [] Goal not targeted   Comments:    Wes will participate in a variety of bilateral coordination tasks (e.g. zipper, catching a ball, stabilizing paper while coloring) with moderate assistance in 90% of opportunities to promote increased independence with self-care and play activities.  [] New goal         [x] Goal in progress   [] Goal met         [] Goal modified  [] Goal targeted  [x] Goal not targeted   Comments:       Wes will print first name without a visual guide and remaining within boundaries on three-lined paper with 75% accuracy on 2/3 trials.  [] New goal         [x] Goal in progress   [] Goal met         [] Goal modified  [x] Goal targeted  [] Goal not targeted   Comments:      Wes will cut along a variety of straight and gently curved lines within 1/2-1/4\" of boundaries independently or stabilizing paper on 75% of given opportunities.  [] New goal         [x] Goal in progress   [] Goal met         [] Goal modified  [] Goal targeted  [x] Goal not targeted   Comments:      Wes will be able to copy simple shapes with minimal prompts across 3 consecutive sessions [] New goal         [] Goal in progress   [x] Goal met         [] Goal modified  [] Goal targeted  [x] Goal not targeted   Comments:      Long Term Goals  Goal Goal Status   Wes will improve social emotional skills and sensory processing abilities to interact effectively with people and objects in his environment, 75% of given opportunities. [] New goal         [x] Goal in progress   [] Goal met         [] Goal modified  [] Goal targeted  [] Goal not targeted   Comments:  continue    Wes will improve FM skills, " "visual-perceptual-skills, and bilateral integration for increased participation in developmentally appropriate play skills, 75% of given opportunities.  [] New goal         [x] Goal in progress   [] Goal met         [] Goal modified  [] Goal targeted  [] Goal not targeted   Comments:      Intervention Comments:  Billing Code Interventions Performed   Therapeutic Activity -vm handwriting: able to copy first and last name with visual guide with 75% accuracy reviewing formation with \"e and a\" within 1 inch lined paper  -Shoe tying :Mod A for bilateral coordination   Therapeutic Exercise    Neuromuscular Re-Education -Dynamic Balance on the BOSU for toss and catch off the rebounder 2x 10 reps with 90% accuracy   -balance block steps with mod verbal cues to slow body with steps   -Rock Wall: mod verbal cues to motor plan steps laterally      Manual    Self-Care    Sensory Integration Swinging on T swing: for up to 2 minutes 3x  -rock wall, jumping on trampoline and over hurdles  -rolling down incline mat  -Theraputty: Able to pull and stretch to remove objects independently, is able to stay seated for up to 4 minutes minutes with task    Cognitive Skills    Group    Other:              Patient and Family Training and Education:  Topics: Performance in session  Methods: Discussion  Response: Verbalized understanding  Recipient: Mother    ASSESSMENT  Wes Nolan participated in the treatment session well.  Barriers to engagement include: dysregulation, hyperactivity, and impulsivity.  Skilled occupational therapy intervention continues to be required at the recommended frequency due to deficits in sensory regulation, fine motor skills, and visual motor skills.  During today’s treatment session, Wes Nolan demonstrated progress in the areas of regulation with sensory movement in a large gym with heavy work with using obstacles courses.       PLAN  Continue per plan of care. 1-2 time per week      "

## 2025-06-20 ENCOUNTER — TELEPHONE (OUTPATIENT)
Dept: PRIMARY CARE | Facility: CLINIC | Age: 65
End: 2025-06-20
Payer: COMMERCIAL

## 2025-07-24 DIAGNOSIS — I10 PRIMARY HYPERTENSION: Chronic | ICD-10-CM

## 2025-07-30 DIAGNOSIS — I10 PRIMARY HYPERTENSION: Chronic | ICD-10-CM

## 2025-07-30 RX ORDER — LISINOPRIL 2.5 MG/1
2.5 TABLET ORAL DAILY
Qty: 30 TABLET | Refills: 0 | Status: SHIPPED | OUTPATIENT
Start: 2025-07-30

## 2025-07-30 RX ORDER — LISINOPRIL 2.5 MG/1
2.5 TABLET ORAL DAILY
Qty: 30 TABLET | Refills: 5 | Status: SHIPPED | OUTPATIENT
Start: 2025-07-30 | End: 2025-07-30 | Stop reason: SDUPTHER

## 2025-08-04 PROBLEM — E66.811 CLASS 1 OBESITY DUE TO EXCESS CALORIES WITH BODY MASS INDEX (BMI) OF 33.0 TO 33.9 IN ADULT: Chronic | Status: ACTIVE | Noted: 2025-08-04

## 2025-08-04 PROBLEM — E66.09 CLASS 1 OBESITY DUE TO EXCESS CALORIES WITH BODY MASS INDEX (BMI) OF 33.0 TO 33.9 IN ADULT: Chronic | Status: ACTIVE | Noted: 2025-08-04

## 2025-08-04 NOTE — PROGRESS NOTES
PRIMARY CARE- OFFICE VISIT         Subjective        Subjective        Delaney Kaplan. Is 64 y.o.. female. Here for follow up office visit-       Follow up  on hgba1c   on metformin  trying to lower A1c for surgery states feels like she is eating  more          HPI:        Follow up for DM, BP, vit d, elevated BMI   Cholesterol- pt has declined statin     Pt taking metformin, lisinopril       Pt is doing well- but worried about weight - gained 6 pounds     Lab Results   Component Value Date    ALBUMINUR 13.2 01/17/2025    HGBA1C 8.0 (H) 01/17/2025       Pt wants to plan knee surgery- goal Hgba1c < 7       Taking metformin once per day       Due for lab       Lab on 01/17/2025   Component Date Value Ref Range Status    Albumin 01/17/2025 4.7  3.4 - 5.0 g/dL Final    Bilirubin, Total 01/17/2025 0.7  0.0 - 1.2 mg/dL Final    Bilirubin, Direct 01/17/2025 0.1  0.0 - 0.3 mg/dL Final    Alkaline Phosphatase 01/17/2025 59  33 - 136 U/L Final    ALT 01/17/2025 41  7 - 45 U/L Final    Patients treated with Sulfasalazine may generate falsely decreased results for ALT.    AST 01/17/2025 23  9 - 39 U/L Final    Total Protein 01/17/2025 7.0  6.4 - 8.2 g/dL Final    Cholesterol 01/17/2025 211 (H)  0 - 199 mg/dL Final          Age      Desirable   Borderline High   High     0-19 Y     0 - 169       170 - 199     >/= 200    20-24 Y     0 - 189       190 - 224     >/= 225         >24 Y     0 - 199       200 - 239     >/= 240   **All ranges are based on fasting samples. Specific   therapeutic targets will vary based on patient-specific   cardiac risk.    Pediatric guidelines reference:Pediatrics 2011, 128(S5).Adult guidelines reference: NCEP ATPIII Guidelines,ASIA 2001, 258:2486-97    Venipuncture immediately after or during the administration of Metamizole may lead to falsely low results. Testing should be performed immediately prior to Metamizole dosing.    HDL-Cholesterol 01/17/2025 56.2  mg/dL Final      Age       Very Low   Low      Normal    High    0-19 Y    < 35      < 40     40-45     ----  20-24 Y    ----     < 40      >45      ----        >24 Y      ----     < 40     40-60      >60      Cholesterol/HDL Ratio 01/17/2025 3.8   Final      Ref Values  Desirable  < 3.4  High Risk  > 5.0    LDL Calculated 01/17/2025 125 (H)  <=99 mg/dL Final                                Near   Borderline      AGE      Desirable  Optimal    High     High     Very High     0-19 Y     0 - 109     ---    110-129   >/= 130     ----    20-24 Y     0 - 119     ---    120-159   >/= 160     ----      >24 Y     0 -  99   100-129  130-159   160-189     >/=190      VLDL 01/17/2025 30  0 - 40 mg/dL Final    Triglycerides 01/17/2025 150 (H)  0 - 149 mg/dL Final    Age              Desirable        Borderline         High        Very High  SEX:B           mg/dL             mg/dL               mg/dL      mg/dL  <=14D                       ----               ----        ----  15D-365D                    ----               ----        ----  1Y-9Y           0-74               75-99             >=100       ----  10Y-19Y        0-89                            >=130       ----  20Y-24Y        0-114             115-149             >=150      ----  >= 25Y         0-149             150-199             200-499    >=500      Venipuncture immediately after or during the administration of Metamizole may lead to falsely low results. Testing should be performed immediately prior to Metamizole dosing.    Non HDL Cholesterol 01/17/2025 155 (H)  0 - 149 mg/dL Final          Age       Desirable   Borderline High   High     Very High     0-19 Y     0 - 119       120 - 144     >/= 145    >/= 160    20-24 Y     0 - 149       150 - 189     >/= 190      ----         >24 Y    30 mg/dL above LDL Cholesterol goal      Vitamin D, 25-Hydroxy, Total 01/17/2025 37  30 - 100 ng/mL Final    Hemoglobin A1C 01/17/2025 8.0 (H)  See comment % Final    Estimated Average Glucose 01/17/2025 183   "Not Established mg/dL Final   Office Visit on 01/17/2025   Component Date Value Ref Range Status    Albumin, Urine Random 01/17/2025 13.2  Not established mg/L Final    Creatinine, Urine Random 01/17/2025 112.7  20.0 - 320.0 mg/dL Final    Albumin/Creatinine Ratio 01/17/2025 11.7  ug/mg Creat Final           Tobacco Use History[1]         Social History     Substance and Sexual Activity   Alcohol Use Not Currently    Comment: rare            Current Medications[2]           Family History[3]         Cancer-related family history includes Breast cancer in her sister.                  Lifestyle and medical management to decrease cardiovascular risks.      The following portions of the patient's chart were reviewed in this encounter and updated as appropriate:  Tobacco  Allergies  Meds  Problems  Med Hx  Surg Hx  Fam Hx       REVIEW OF SYSTEMS:    Review of Systems       Objective          Objective      Vitals:    08/05/25 0709   BP: 123/81   BP Location: Left arm   Patient Position: Sitting   BP Cuff Size: Adult   Pulse: 70   Resp: 12   Temp: 36.3 °C (97.3 °F)   TempSrc: Temporal   SpO2: 98%   Weight: 96.2 kg (212 lb 1.6 oz)   Height: 1.651 m (5' 5\")                       PHYSICAL:      Patient is alert and oriented x 3 , NAD  HEAD- normocephalic and atraumatic   EYES-conjunctiva-normal, lids - normal  EARS/NOSE- normal external exam   NECK-supple,FROM  CV- RRR without murmur  PULM- CTA bilaterally, normal respiratory effort  RESPIRATORY EFFORT- normal , no retractions or nasal flaring   ABD- normoactive BS's   EXT- no edema,NT  SKIN- no abnormal skin lesions noted  NEURO- no focal deficits  PSYCH- pleasant, normal judgement and insight      BP Readings from Last 3 Encounters:   08/05/25 123/81   01/17/25 126/78   01/09/25 118/76             Wt Readings from Last 3 Encounters:   08/05/25 96.2 kg (212 lb 1.6 oz)   01/17/25 93.5 kg (206 lb 1.6 oz)   01/09/25 93.6 kg (206 lb 6.4 oz)           BMI Readings from " Last 3 Encounters:   08/05/25 35.30 kg/m²   01/17/25 34.30 kg/m²   01/09/25 34.35 kg/m²               The number and complexity of problems addressed is considered moderate.  The amount and/or complexity of data reviewed and analyzed is considered moderate. The risk of complications and/or morbidity/mortality of patient is considered moderate. Overall, this patient encounter is considered a moderate risk visit.      ACEI:    Do not get pregnant while taking this Medication     Common Side Effects- ACEI    Cough  Dizziness  Feeling tired  Headache  Problems sleeping  Fast heart beat    Call your doctor if you have any of these signs:  Chest pain  Problems breathing or swallowing  Swelling in the face, eyes, lips, tongue, or legs    Assessment/Plan        Assessment/Plan            Assessment & Plan  Hypercholesterolemia  Fish oil           Type 2 diabetes mellitus without complication, without long-term current use of insulin  Diet and exercise   Lisinopril           Vitamin D deficiency  Taking supplements           Type 2 diabetes, HbA1c goal < 7% (Multi)  Metformin  Lisinopril   Diet and exercise         Hx of migraines  Stable , mild , imtermittent           Class 1 obesity due to excess calories with body mass index (BMI) of 33.0 to 33.9 in adult, unspecified whether serious comorbidity present  Patient's BMI is elevated.  Plan- diet and exercise- BMI is elevated. Need to increase activity on a daily basis especially walking.  Monitor  total calories per day- decrease carbohydrates and fats. Goal - lose 1-2 pounds per week.    Recommend 150 minutes of moderate-intensity exercise as tolerated per week and 2-3 days of resistance, flexibility, and neuromotor exercises per week.    Normal BMI- 18.5-25    Overweight=  BMI 26-29    Obese= BMI 30-39    Morbidly Obese = BMI >40           Primary hypertension  On lisinopril                                 Continue medication      Ordered lab      Follow up in 6  months        Time spent during the office visit includes-    updating and familiarizing myself with patients past medical history, social history, and allergies :  discussing ROS ;  obtaining direct  chief complaint and history of present illness from patient ,  reviewing and reconciling current medication list - both prescription and over the counter medications    clinically examine patient    determine what testing if any is needed to  diagnose the condition/conditions  with which patient is presenting    using medical knowledge to put all of the information together and decide on best course of action to proceed with diagnosis  and  treatment for the presenting problem or problems      Pre-visit planning, chart review, and face-to-face encounter   Discussion of medications  Coordination with office staff for med adjustments   Final documentation of visit        My total time spent caring for the patient for the day of the encounter (before,during, and after) was =     >30     total minutes.    (Prep+during visit+post visit)         [1]   Social History  Tobacco Use   Smoking Status Former    Current packs/day: 0.00    Average packs/day: 1.5 packs/day for 6.0 years (9.0 ttl pk-yrs)    Types: Cigarettes    Start date:     Quit date:     Years since quittin.6    Passive exposure: Past   Smokeless Tobacco Never   [2]   Current Outpatient Medications:     cinnamon bark (CINNAMON ORAL), Take 1 capsule by mouth once daily., Disp: , Rfl:     cyanocobalamin (Vitamin B-12) 250 mcg tablet, Take 2 tablets (500 mcg) by mouth once daily., Disp: , Rfl:     docosahexaenoic acid/epa (FISH OIL ORAL), Take 1 capsule by mouth once daily., Disp: , Rfl:     lisinopril 2.5 mg tablet, Take 1 tablet (2.5 mg) by mouth once daily., Disp: 30 tablet, Rfl: 0    metFORMIN XR (Glucophage-XR) 500 mg 24 hr tablet, Take 1 tablet (500 mg) by mouth once daily with breakfast. Do not crush, chew, or split., Disp: 30 tablet, Rfl: 11     pediatric multivitamin (Gummi Bear Multivitamin) tablet,chewable, Chew and swallow 1 tablet once daily., Disp: , Rfl:     TURMERIC ORAL, Take by mouth., Disp: , Rfl:     ipratropium (Atrovent) 21 mcg (0.03 %) nasal spray, Administer 2 sprays into each nostril every 12 hours for 7 days., Disp: 30 mL, Rfl: 0  [3]   Family History  Problem Relation Name Age of Onset    Diabetes Mother      Breast cancer Sister

## 2025-08-05 ENCOUNTER — APPOINTMENT (OUTPATIENT)
Dept: PRIMARY CARE | Facility: CLINIC | Age: 65
End: 2025-08-05
Payer: COMMERCIAL

## 2025-08-05 VITALS
BODY MASS INDEX: 35.34 KG/M2 | TEMPERATURE: 97.3 F | HEIGHT: 65 IN | WEIGHT: 212.1 LBS | SYSTOLIC BLOOD PRESSURE: 123 MMHG | DIASTOLIC BLOOD PRESSURE: 81 MMHG | RESPIRATION RATE: 12 BRPM | HEART RATE: 70 BPM | OXYGEN SATURATION: 98 %

## 2025-08-05 DIAGNOSIS — E55.9 VITAMIN D DEFICIENCY: Chronic | ICD-10-CM

## 2025-08-05 DIAGNOSIS — E78.00 HYPERCHOLESTEROLEMIA: Primary | Chronic | ICD-10-CM

## 2025-08-05 DIAGNOSIS — E11.9 TYPE 2 DIABETES MELLITUS WITHOUT COMPLICATION, WITHOUT LONG-TERM CURRENT USE OF INSULIN: Chronic | ICD-10-CM

## 2025-08-05 DIAGNOSIS — E11.9 TYPE 2 DIABETES, HBA1C GOAL < 7% (MULTI): Chronic | ICD-10-CM

## 2025-08-05 DIAGNOSIS — E66.09 CLASS 1 OBESITY DUE TO EXCESS CALORIES WITH BODY MASS INDEX (BMI) OF 33.0 TO 33.9 IN ADULT, UNSPECIFIED WHETHER SERIOUS COMORBIDITY PRESENT: Chronic | ICD-10-CM

## 2025-08-05 DIAGNOSIS — I10 PRIMARY HYPERTENSION: Chronic | ICD-10-CM

## 2025-08-05 DIAGNOSIS — Z86.69 HX OF MIGRAINES: Chronic | ICD-10-CM

## 2025-08-05 DIAGNOSIS — E66.811 CLASS 1 OBESITY DUE TO EXCESS CALORIES WITH BODY MASS INDEX (BMI) OF 33.0 TO 33.9 IN ADULT, UNSPECIFIED WHETHER SERIOUS COMORBIDITY PRESENT: Chronic | ICD-10-CM

## 2025-08-05 PROCEDURE — 99214 OFFICE O/P EST MOD 30 MIN: CPT | Performed by: FAMILY MEDICINE

## 2025-08-05 PROCEDURE — 3074F SYST BP LT 130 MM HG: CPT | Performed by: FAMILY MEDICINE

## 2025-08-05 PROCEDURE — 3079F DIAST BP 80-89 MM HG: CPT | Performed by: FAMILY MEDICINE

## 2025-08-05 PROCEDURE — 4010F ACE/ARB THERAPY RXD/TAKEN: CPT | Performed by: FAMILY MEDICINE

## 2025-08-05 PROCEDURE — 3008F BODY MASS INDEX DOCD: CPT | Performed by: FAMILY MEDICINE

## 2025-08-06 PROBLEM — Z53.20: Chronic | Status: ACTIVE | Noted: 2025-08-06

## 2025-08-06 LAB
25(OH)D3+25(OH)D2 SERPL-MCNC: 34 NG/ML (ref 30–100)
ALBUMIN SERPL-MCNC: 4.3 G/DL (ref 3.6–5.1)
ALBUMIN/CREAT UR: 18 MG/G CREAT
ALBUMIN/GLOB SERPL: 1.9 (CALC) (ref 1–2.5)
ALP SERPL-CCNC: 68 U/L (ref 37–153)
ALT SERPL-CCNC: 49 U/L (ref 6–29)
ANION GAP SERPL CALCULATED.4IONS-SCNC: 9 MMOL/L (CALC) (ref 7–17)
AST SERPL-CCNC: 28 U/L (ref 10–35)
BILIRUB DIRECT SERPL-MCNC: 0.1 MG/DL
BILIRUB INDIRECT SERPL-MCNC: 0.5 MG/DL (CALC) (ref 0.2–1.2)
BILIRUB SERPL-MCNC: 0.6 MG/DL (ref 0.2–1.2)
BUN SERPL-MCNC: 22 MG/DL (ref 7–25)
BUN/CREAT SERPL: ABNORMAL (CALC) (ref 6–22)
CALCIUM SERPL-MCNC: 8.9 MG/DL (ref 8.6–10.4)
CHLORIDE SERPL-SCNC: 102 MMOL/L (ref 98–110)
CHOLEST SERPL-MCNC: 191 MG/DL
CHOLEST/HDLC SERPL: 3.8 (CALC)
CO2 SERPL-SCNC: 25 MMOL/L (ref 20–32)
CREAT SERPL-MCNC: 0.69 MG/DL (ref 0.5–1.05)
CREAT UR-MCNC: 145 MG/DL (ref 20–275)
EGFRCR SERPLBLD CKD-EPI 2021: 98 ML/MIN/1.73M2
GLOBULIN SER CALC-MCNC: 2.3 G/DL (CALC) (ref 1.9–3.7)
GLUCOSE SERPL-MCNC: 257 MG/DL (ref 65–99)
HDLC SERPL-MCNC: 50 MG/DL
LDLC SERPL CALC-MCNC: 114 MG/DL (CALC)
MICROALBUMIN UR-MCNC: 2.6 MG/DL
NONHDLC SERPL-MCNC: 141 MG/DL (CALC)
POTASSIUM SERPL-SCNC: 4.1 MMOL/L (ref 3.5–5.3)
PROT SERPL-MCNC: 6.6 G/DL (ref 6.1–8.1)
SODIUM SERPL-SCNC: 136 MMOL/L (ref 135–146)
TRIGL SERPL-MCNC: 157 MG/DL

## 2025-08-12 DIAGNOSIS — E55.9 VITAMIN D DEFICIENCY: Chronic | ICD-10-CM

## 2025-08-12 DIAGNOSIS — E11.9 TYPE 2 DIABETES, HBA1C GOAL < 7% (MULTI): Chronic | ICD-10-CM

## 2025-08-12 DIAGNOSIS — E11.9 TYPE 2 DIABETES MELLITUS WITHOUT COMPLICATION, WITHOUT LONG-TERM CURRENT USE OF INSULIN: ICD-10-CM

## 2025-08-13 ENCOUNTER — TELEPHONE (OUTPATIENT)
Dept: PRIMARY CARE | Facility: CLINIC | Age: 65
End: 2025-08-13
Payer: COMMERCIAL

## 2025-08-13 LAB
25(OH)D3+25(OH)D2 SERPL-MCNC: 34 NG/ML (ref 30–100)
EST. AVERAGE GLUCOSE BLD GHB EST-MCNC: 209 MG/DL
EST. AVERAGE GLUCOSE BLD GHB EST-SCNC: 11.6 MMOL/L
HBA1C MFR BLD: 8.9 %

## 2025-08-13 RX ORDER — METFORMIN HYDROCHLORIDE 500 MG/1
TABLET, EXTENDED RELEASE ORAL
Qty: 90 TABLET | Refills: 6 | Status: SHIPPED | OUTPATIENT
Start: 2025-08-13

## 2025-08-18 DIAGNOSIS — E11.9 TYPE 2 DIABETES MELLITUS WITHOUT COMPLICATION, WITHOUT LONG-TERM CURRENT USE OF INSULIN: ICD-10-CM

## 2025-08-18 RX ORDER — METFORMIN HYDROCHLORIDE 500 MG/1
TABLET, EXTENDED RELEASE ORAL
Qty: 100 TABLET | Refills: 6 | Status: SHIPPED | OUTPATIENT
Start: 2025-08-18 | End: 2025-08-20 | Stop reason: SDUPTHER

## 2025-08-20 DIAGNOSIS — E11.9 TYPE 2 DIABETES MELLITUS WITHOUT COMPLICATION, WITHOUT LONG-TERM CURRENT USE OF INSULIN: ICD-10-CM

## 2025-08-20 RX ORDER — METFORMIN HYDROCHLORIDE 500 MG/1
TABLET, EXTENDED RELEASE ORAL
Qty: 100 TABLET | Refills: 6 | Status: SHIPPED | OUTPATIENT
Start: 2025-08-20

## 2025-08-20 RX ORDER — METFORMIN HYDROCHLORIDE 500 MG/1
TABLET, EXTENDED RELEASE ORAL
Qty: 100 TABLET | Refills: 6 | Status: SHIPPED | OUTPATIENT
Start: 2025-08-20 | End: 2025-08-20 | Stop reason: SDUPTHER

## 2025-08-26 ENCOUNTER — OFFICE VISIT (OUTPATIENT)
Dept: PRIMARY CARE | Facility: CLINIC | Age: 65
End: 2025-08-26
Payer: COMMERCIAL

## 2025-08-26 VITALS
RESPIRATION RATE: 14 BRPM | WEIGHT: 212 LBS | BODY MASS INDEX: 35.28 KG/M2 | HEART RATE: 80 BPM | SYSTOLIC BLOOD PRESSURE: 116 MMHG | OXYGEN SATURATION: 97 % | TEMPERATURE: 98.5 F | DIASTOLIC BLOOD PRESSURE: 74 MMHG

## 2025-08-26 DIAGNOSIS — J02.9 PHARYNGITIS, UNSPECIFIED ETIOLOGY: Chronic | ICD-10-CM

## 2025-08-26 DIAGNOSIS — R09.81 NASAL CONGESTION: Primary | Chronic | ICD-10-CM

## 2025-08-26 DIAGNOSIS — R05.9 COUGH, UNSPECIFIED TYPE: Chronic | ICD-10-CM

## 2025-08-26 PROCEDURE — 3078F DIAST BP <80 MM HG: CPT | Performed by: FAMILY MEDICINE

## 2025-08-26 PROCEDURE — 4010F ACE/ARB THERAPY RXD/TAKEN: CPT | Performed by: FAMILY MEDICINE

## 2025-08-26 PROCEDURE — 3074F SYST BP LT 130 MM HG: CPT | Performed by: FAMILY MEDICINE

## 2025-08-26 PROCEDURE — 99214 OFFICE O/P EST MOD 30 MIN: CPT | Performed by: FAMILY MEDICINE

## 2025-08-26 RX ORDER — AMOXICILLIN 500 MG/1
CAPSULE ORAL
Qty: 40 CAPSULE | Refills: 0 | Status: SHIPPED | OUTPATIENT
Start: 2025-08-26

## 2026-02-06 ENCOUNTER — APPOINTMENT (OUTPATIENT)
Dept: PRIMARY CARE | Facility: CLINIC | Age: 66
End: 2026-02-06
Payer: COMMERCIAL